# Patient Record
Sex: MALE | Race: BLACK OR AFRICAN AMERICAN | NOT HISPANIC OR LATINO | Employment: OTHER | URBAN - METROPOLITAN AREA
[De-identification: names, ages, dates, MRNs, and addresses within clinical notes are randomized per-mention and may not be internally consistent; named-entity substitution may affect disease eponyms.]

---

## 2021-05-26 ENCOUNTER — RECORDS - HEALTHEAST (OUTPATIENT)
Dept: ADMINISTRATIVE | Facility: CLINIC | Age: 66
End: 2021-05-26

## 2021-06-02 ENCOUNTER — RECORDS - HEALTHEAST (OUTPATIENT)
Dept: ADMINISTRATIVE | Facility: CLINIC | Age: 66
End: 2021-06-02

## 2022-05-09 ENCOUNTER — HOSPITAL ENCOUNTER (EMERGENCY)
Facility: HOSPITAL | Age: 67
Discharge: HOME | End: 2022-05-09
Attending: EMERGENCY MEDICINE | Admitting: EMERGENCY MEDICINE
Payer: COMMERCIAL

## 2022-05-09 VITALS
OXYGEN SATURATION: 98 % | HEART RATE: 60 BPM | RESPIRATION RATE: 20 BRPM | SYSTOLIC BLOOD PRESSURE: 141 MMHG | TEMPERATURE: 98.7 F | WEIGHT: 173 LBS | DIASTOLIC BLOOD PRESSURE: 70 MMHG

## 2022-05-09 DIAGNOSIS — R33.9 URINARY RETENTION: ICD-10-CM

## 2022-05-09 LAB
ALBUMIN UR-MCNC: NEGATIVE MG/DL
APPEARANCE UR: CLEAR
BILIRUB UR QL STRIP: NEGATIVE
COLOR UR AUTO: ABNORMAL
GLUCOSE UR STRIP-MCNC: NEGATIVE MG/DL
HGB UR QL STRIP: NEGATIVE
KETONES UR STRIP-MCNC: NEGATIVE MG/DL
LEUKOCYTE ESTERASE UR QL STRIP: NEGATIVE
NITRATE UR QL: NEGATIVE
PH UR STRIP: 7 [PH] (ref 5–7)
RBC URINE: 4 /HPF
SP GR UR STRIP: 1.02 (ref 1–1.03)
TRANSITIONAL EPI: <1 /HPF
UROBILINOGEN UR STRIP-MCNC: <2 MG/DL
WBC URINE: <1 /HPF

## 2022-05-09 PROCEDURE — 51702 INSERT TEMP BLADDER CATH: CPT

## 2022-05-09 PROCEDURE — 99284 EMERGENCY DEPT VISIT MOD MDM: CPT | Mod: 25

## 2022-05-09 PROCEDURE — 250N000013 HC RX MED GY IP 250 OP 250 PS 637: Performed by: EMERGENCY MEDICINE

## 2022-05-09 PROCEDURE — 81001 URINALYSIS AUTO W/SCOPE: CPT | Performed by: EMERGENCY MEDICINE

## 2022-05-09 PROCEDURE — 51798 US URINE CAPACITY MEASURE: CPT

## 2022-05-09 RX ORDER — TAMSULOSIN HYDROCHLORIDE 0.4 MG/1
0.4 CAPSULE ORAL DAILY
Qty: 10 CAPSULE | Refills: 0 | Status: SHIPPED | OUTPATIENT
Start: 2022-05-09 | End: 2022-05-19

## 2022-05-09 RX ORDER — OXYCODONE HYDROCHLORIDE 5 MG/1
5 TABLET ORAL ONCE
Status: COMPLETED | OUTPATIENT
Start: 2022-05-09 | End: 2022-05-09

## 2022-05-09 RX ORDER — ACETAMINOPHEN 325 MG/1
650 TABLET ORAL ONCE
Status: DISCONTINUED | OUTPATIENT
Start: 2022-05-09 | End: 2022-05-10 | Stop reason: HOSPADM

## 2022-05-09 RX ADMIN — OXYCODONE HYDROCHLORIDE 5 MG: 5 TABLET ORAL at 22:41

## 2022-05-09 NOTE — ED TRIAGE NOTES
Patient arrives by private car for evaluation of inability to urinate.  Patient had lumbar decompression this am, has been unable to urinate since then.

## 2022-05-09 NOTE — ED NOTES
ED Provider In Triage Note  [facility]  Encounter Date: May 9, 2022    Chief Complaint   Patient presents with     Urinary Retention         Use of Intrepreter: N/A      Brief HPI:   Mikhail Espino is a 67 year old male presenting to the Emergency Department with a chief complaint of urinary retention.     Outpatient lumbar decompression this morning and hasn't been able to urinate since.    Denies any current back pain but has 10/10 bladder discomfort.    Denies new numbness or weakness in legs.        Brief Physical Exam:  BP (!) 176/92   Pulse 84   Temp 98.4  F (36.9  C) (Oral)   Resp 22   Wt 78.5 kg (173 lb)   SpO2 100%   General: Non-toxic appearing  HEENT: Atraumatic  Resp: No respiratory distress  Abdomen: +lower abd pain  Neuro: Alert, oriented, answers questions appropriately  Psych: Behavior appropriate  Musculoskeletal: walked into triage without limitation.      Plan Initiated in Triage:  Bladder scan      PIT Dispo:   Return to lobby while awaiting workup and ED bed availability          Humera Sloan MD on 5/9/2022 at 6:51 PM        Patient was evaluated by the Physician in Triage due to a limitation of available rooms in the Emergency Department. A plan of care was discussed based on the information obtained on the initial evaluation and patient was counseled to return back to the Emergency Department lobby after this initial evalutaiton until results were obtained or a room became available in the Emergency Department. Patient was counseled not to leave prior to receiving the results of their workup.            Humera Sloan MD  05/09/22 [id number]

## 2022-05-10 NOTE — DISCHARGE INSTRUCTIONS
Continue Flomax.  Follow-up with your urologist this week for reassessment and Thacker catheter removal.  Return to the emergency department if nondraining catheter problems with follow-up.

## 2022-05-10 NOTE — ED NOTES
Pt and wife very upset about the wait to see a doctor. Pt given oxycodone. Wife has already given pt tylenol in the lobby herself. Wanting to leave. Has catheter in. Wants to lay down. Just had back surgery today.

## 2022-05-10 NOTE — ED PROVIDER NOTES
EMERGENCY DEPARTMENT NOTE     Name: Mikhail Espino    Age/Sex: 67 year old male   MRN: [id number]   Evaluation Date & Time:  No admission date for patient encounter.    PCP:    No primary care provider on file.   ED Provider: Linwood Dumont D.O.       CHIEF COMPLAINT    Urinary Retention       DIAGNOSIS & DISPOSITION     1. Urinary retention      DISPOSITION: Home    At the conclusion of the encounter I discussed the results of all of the tests and the disposition. The questions were answered. The patient or family acknowledged understanding and was agreeable with the care plan.    TOTAL CRITICAL CARE TIME (EXCLUDING PROCEDURES): Not applicable    PROCEDURES:   None    EMERGENCY DEPARTMENT COURSE/MEDICAL DECISION MAKING   11:10 PM I met with the patient to gather history and to perform my initial exam.  We discussed treatment options and the plan for care while in the Emergency Department. PPE worn: N95 mask, surgical mask  11:20 PM We discussed the plan for discharge and the patient is agreeable. Reviewed supportive cares, symptomatic treatment, outpatient follow up, and reasons to return to the Emergency Department. Patient to be discharged by ED RN.     Mikhail Espino is a 67 year old male with relevant past history of back pain who presents to the emergency department for evaluation of urinary retention.     Triage note reviewed:Patient arrives by private car for evaluation of inability to urinate.  Patient had lumbar decompression this am, has been unable to urinate since then    Vital signs:BP (!) 141/70 (BP Location: Right arm, Patient Position: Sitting, Cuff Size: Adult Regular)   Pulse 60   Temp 98.7  F (37.1  C) (Oral)   Resp 20   Wt 78.5 kg (173 lb)   SpO2 98%   Pertinent physical exam findings:  General: Patient resting comfortably on the stretcher feels improved after Thacker catheter placement  Abdomen: Soft nontender, positive bowel sounds.  No organomegaly or mass  Musculoskeletal: Dressing in  place on lumbar wound with no active bleeding  Neuro: 5 out of 5 motor strength of the lower extremities with intact deep tendon reflexes at the patella normal sensation to light touch and positional sense  Diagnostic studies:  Imaging:  No orders to display      Lab:  Labs Ordered and Resulted from Time of ED Arrival to Time of ED Departure   ROUTINE UA WITH MICROSCOPIC REFLEX TO CULTURE - Abnormal       Result Value    Color Urine Light Yellow      Appearance Urine Clear      Glucose Urine Negative      Bilirubin Urine Negative      Ketones Urine Negative      Specific Gravity Urine 1.020      Blood Urine Negative      pH Urine 7.0      Protein Albumin Urine Negative      Urobilinogen Urine <2.0      Nitrite Urine Negative      Leukocyte Esterase Urine Negative      RBC Urine 4 (*)     WBC Urine <1      Transitional Epithelials Urine <1        Interventions: Thacker catheter placement  Medical decision making: Patient urinary retention following lumbar discectomy today.  No acute neurologic symptoms.  Patient has underlying BPH and I have been secondary to combination of anesthesia and underlying BPH.  Patient will be discharged with Thacker catheter in place.  He has been followed by urologist at [facility] and will make appointment for Thacker catheter removal and reassessment.  Continue Flomax until Thacker catheter is removed.  Return criteria discussed and if nondraining Thacker catheter or problems with follow-up will return to the emergency department.    ED INTERVENTIONS     Medications   acetaminophen (TYLENOL) tablet 650 mg (650 mg Oral Not Given 5/9/22 2321)   oxyCODONE (ROXICODONE) tablet 5 mg (5 mg Oral Given 5/9/22 2241)       DISCHARGE MEDICATIONS        Review of your medicines      START taking      Dose / Directions   tamsulosin 0.4 MG capsule  Commonly known as: Flomax      Dose: 0.4 mg  Take 1 capsule (0.4 mg) by mouth daily for 10 doses  Quantity: 10 capsule  Refills: 0           Where to get your  medicines      Some of these will need a paper prescription and others can be bought over the counter. Ask your nurse if you have questions.    Bring a paper prescription for each of these medications    tamsulosin 0.4 MG capsule           INFORMATION SOURCE AND LIMITATIONS    History/Exam limitations: N/A  Patient information was obtained from: Patient   Use of : N/A    HISTORY OF PRESENT ILLNESS   Mikhail Espino is a 67 year old year old male with a relevant past history of back pain, who presents to this ED by walk in for evaluation of urinary retention.    After having back surgery this morning, the patient reports being unable to urinate. The patient also reports being recently diagnosed with prostate cancer. He does note that he has had some difficulty with urinating due to his prostate cancer, but adds that it has never been to this extent. The patient was prescribed Flomax from his back surgeon.   No other symptoms or complaints at this time.       REVIEW OF SYSTEMS:   Constitutional: Negative for  fever.   HENT: Negative for URI symptoms or sore throat.    Cardiac: Negative for  chest pain,palpitations, near syncope or syncope  Respiratory: Negative for cough and shortness of breath.    Gastrointestinal: Negative for abdominal pain, nausea, vomiting, constipation, diarrhea, rectal bleeding or melena.  Genitourinary: Positive for inability to urinate. Negative for dysuria, flank pain and hematuria.   Musculoskeletal: Negative for back pain.   Skin: Negative for  rash  Neurological: Negative for dizziness, headache, syncope, speech difficulty, unilateral weakness or imbalance with walking.   Hematological: Negative for adenopathy. Does not bruise/bleed easily.   Psychiatric/Behavioral: Negative for confusion.       PATIENT HISTORY   History reviewed. No pertinent past medical history.  There is no problem list on file for this patient.    History reviewed. No pertinent surgical history.  Social  Histrory  Smoking:  Alcohol Use:  No Known Allergies      OUTPATIENT MEDICATIONS     New Prescriptions    TAMSULOSIN (FLOMAX) 0.4 MG CAPSULE    Take 1 capsule (0.4 mg) by mouth daily for 10 doses      Vitals:    05/09/22 1854 05/09/22 2137   BP: (!) 176/92 (!) 141/70   BP Location:  Right arm   Patient Position:  Sitting   Cuff Size:  Adult Regular   Pulse: 84 60   Resp: 22 20   Temp: 98.4  F (36.9  C) 98.7  F (37.1  C)   TempSrc: Oral Oral   SpO2: 100% 98%   Weight: 78.5 kg (173 lb)        Physical Exam   Constitutional: Oriented to person, place, and time. Appears well-developed and well-nourished.  HEENT:    Head: Atraumatic.   Neck: Normal range of motion. Neck supple.   Cardiovascular: Normal rate, regular rhythm and normal heart sounds.    Pulmonary/Chest: Normal effort  and breath sounds normal.   Abdominal: Soft. Bowel sounds are normal.   GI: Full length catheter in place.  Musculoskeletal: Normal range of motion. Dressing in place on lower back with small amount of blood but no active bleeding.  Neurological: Alert and oriented to person, place, and time. Normal strength.No sensory deficit. No cranial nerve deficit. 5x5 motor senses. Intact sensations.   Skin: Skin is warm and dry.   Psychiatric: Normal mood and affect. Behavior is normal. Thought content normal.       DIAGNOSTICS    LABORATORY FINDINGS (REVIEWED AND INTERPRETED):  Labs Ordered and Resulted from Time of ED Arrival to Time of ED Departure   ROUTINE UA WITH MICROSCOPIC REFLEX TO CULTURE - Abnormal       Result Value    Color Urine Light Yellow      Appearance Urine Clear      Glucose Urine Negative      Bilirubin Urine Negative      Ketones Urine Negative      Specific Gravity Urine 1.020      Blood Urine Negative      pH Urine 7.0      Protein Albumin Urine Negative      Urobilinogen Urine <2.0      Nitrite Urine Negative      Leukocyte Esterase Urine Negative      RBC Urine 4 (*)     WBC Urine <1      Transitional Epithelials Urine <1                I, Kendall Sebastian, am serving as a scribe to document services personally performed by Linwood Dumont D.O., based on my observation and the provider s statements to me.    I, Linwood Dumont D.O., attest that Kendall Sebastian is acting in a scribe capacity, has observed my performance of the services and has documented them in accordance with my direction.    Linwood Dumont D.O.  EMERGENCY MEDICINE   05/09/22  [facility] EMERGENCY DEPARTMENT  [address] [place] [id number]  [phone]  Dept: [phone]     Linwood Dumont DO  05/10/22 0033

## 2022-06-06 ENCOUNTER — TRANSFERRED RECORDS (OUTPATIENT)
Dept: HEALTH INFORMATION MANAGEMENT | Facility: CLINIC | Age: 67
End: 2022-06-06

## 2022-08-04 RX ORDER — ZINC GLUCONATE 50 MG
50 TABLET ORAL DAILY
Status: ON HOLD | COMMUNITY
End: 2022-08-08

## 2022-08-04 RX ORDER — MULTIPLE VITAMINS W/ MINERALS TAB 9MG-400MCG
1 TAB ORAL DAILY
Status: ON HOLD | COMMUNITY
End: 2022-08-09

## 2022-08-04 RX ORDER — ACETAMINOPHEN 500 MG
500 TABLET ORAL EVERY 6 HOURS PRN
COMMUNITY
End: 2022-08-05

## 2022-08-04 RX ORDER — UBIDECARENONE 100 MG
100 CAPSULE ORAL DAILY
Status: ON HOLD | COMMUNITY
End: 2022-08-09

## 2022-08-07 ENCOUNTER — ANESTHESIA EVENT (OUTPATIENT)
Dept: SURGERY | Facility: HOSPITAL | Age: 67
End: 2022-08-07
Payer: COMMERCIAL

## 2022-08-08 ENCOUNTER — ANESTHESIA (OUTPATIENT)
Dept: SURGERY | Facility: HOSPITAL | Age: 67
End: 2022-08-08
Payer: COMMERCIAL

## 2022-08-08 ENCOUNTER — HOSPITAL ENCOUNTER (OUTPATIENT)
Facility: HOSPITAL | Age: 67
Setting detail: OBSERVATION
Discharge: HOME | End: 2022-08-10
Attending: UROLOGY | Admitting: UROLOGY
Payer: COMMERCIAL

## 2022-08-08 DIAGNOSIS — C61 PROSTATE CANCER (H): Primary | ICD-10-CM

## 2022-08-08 LAB
ERYTHROCYTE [DISTWIDTH] IN BLOOD BY AUTOMATED COUNT: 13.6 % (ref 10–15)
HCT VFR BLD AUTO: 42.7 % (ref 40–53)
HGB BLD-MCNC: 14.5 G/DL (ref 13.3–17.7)
HOLD SPECIMEN: NORMAL
MCH RBC QN AUTO: 30.1 PG (ref 26.5–33)
MCHC RBC AUTO-ENTMCNC: 34 G/DL (ref 31.5–36.5)
MCV RBC AUTO: 89 FL (ref 78–100)
PLATELET # BLD AUTO: 119 10E3/UL (ref 150–450)
RBC # BLD AUTO: 4.82 10E6/UL (ref 4.4–5.9)
WBC # BLD AUTO: 2.9 10E3/UL (ref 4–11)

## 2022-08-08 PROCEDURE — 96361 HYDRATE IV INFUSION ADD-ON: CPT

## 2022-08-08 PROCEDURE — 250N000011 HC RX IP 250 OP 636: Performed by: NURSE ANESTHETIST, CERTIFIED REGISTERED

## 2022-08-08 PROCEDURE — G0378 HOSPITAL OBSERVATION PER HR: HCPCS

## 2022-08-08 PROCEDURE — 710N000009 HC RECOVERY PHASE 1, LEVEL 1, PER MIN: Performed by: UROLOGY

## 2022-08-08 PROCEDURE — 85027 COMPLETE CBC AUTOMATED: CPT | Performed by: ANESTHESIOLOGY

## 2022-08-08 PROCEDURE — 258N000003 HC RX IP 258 OP 636: Performed by: ANESTHESIOLOGY

## 2022-08-08 PROCEDURE — 120N000001 HC R&B MED SURG/OB

## 2022-08-08 PROCEDURE — 250N000009 HC RX 250: Performed by: UROLOGY

## 2022-08-08 PROCEDURE — 250N000009 HC RX 250: Performed by: NURSE ANESTHETIST, CERTIFIED REGISTERED

## 2022-08-08 PROCEDURE — 360N000080 HC SURGERY LEVEL 7, PER MIN: Performed by: UROLOGY

## 2022-08-08 PROCEDURE — 96360 HYDRATION IV INFUSION INIT: CPT | Mod: XU

## 2022-08-08 PROCEDURE — 250N000013 HC RX MED GY IP 250 OP 250 PS 637: Performed by: ANESTHESIOLOGY

## 2022-08-08 PROCEDURE — 250N000011 HC RX IP 250 OP 636: Performed by: NURSE PRACTITIONER

## 2022-08-08 PROCEDURE — 272N000001 HC OR GENERAL SUPPLY STERILE: Performed by: UROLOGY

## 2022-08-08 PROCEDURE — 36415 COLL VENOUS BLD VENIPUNCTURE: CPT | Performed by: ANESTHESIOLOGY

## 2022-08-08 PROCEDURE — 88307 TISSUE EXAM BY PATHOLOGIST: CPT | Mod: TC | Performed by: UROLOGY

## 2022-08-08 PROCEDURE — 258N000003 HC RX IP 258 OP 636: Performed by: UROLOGY

## 2022-08-08 PROCEDURE — 250N000025 HC SEVOFLURANE, PER MIN: Performed by: UROLOGY

## 2022-08-08 PROCEDURE — 999N000141 HC STATISTIC PRE-PROCEDURE NURSING ASSESSMENT: Performed by: UROLOGY

## 2022-08-08 PROCEDURE — 250N000013 HC RX MED GY IP 250 OP 250 PS 637: Performed by: UROLOGY

## 2022-08-08 PROCEDURE — 370N000017 HC ANESTHESIA TECHNICAL FEE, PER MIN: Performed by: UROLOGY

## 2022-08-08 PROCEDURE — 88309 TISSUE EXAM BY PATHOLOGIST: CPT | Mod: TC | Performed by: UROLOGY

## 2022-08-08 RX ORDER — KETAMINE HYDROCHLORIDE 10 MG/ML
INJECTION INTRAMUSCULAR; INTRAVENOUS PRN
Status: DISCONTINUED | OUTPATIENT
Start: 2022-08-08 | End: 2022-08-08

## 2022-08-08 RX ORDER — ACETAMINOPHEN 325 MG/1
975 TABLET ORAL EVERY 8 HOURS
Status: DISCONTINUED | OUTPATIENT
Start: 2022-08-08 | End: 2022-08-10 | Stop reason: HOSPADM

## 2022-08-08 RX ORDER — OXYCODONE HYDROCHLORIDE 5 MG/1
10 TABLET ORAL EVERY 4 HOURS PRN
Status: DISCONTINUED | OUTPATIENT
Start: 2022-08-08 | End: 2022-08-10 | Stop reason: HOSPADM

## 2022-08-08 RX ORDER — GLYCOPYRROLATE 0.2 MG/ML
INJECTION, SOLUTION INTRAMUSCULAR; INTRAVENOUS PRN
Status: DISCONTINUED | OUTPATIENT
Start: 2022-08-08 | End: 2022-08-08

## 2022-08-08 RX ORDER — PROPOFOL 10 MG/ML
INJECTION, EMULSION INTRAVENOUS PRN
Status: DISCONTINUED | OUTPATIENT
Start: 2022-08-08 | End: 2022-08-08

## 2022-08-08 RX ORDER — OXYCODONE HYDROCHLORIDE 5 MG/1
5 TABLET ORAL EVERY 4 HOURS PRN
Status: DISCONTINUED | OUTPATIENT
Start: 2022-08-08 | End: 2022-08-10 | Stop reason: HOSPADM

## 2022-08-08 RX ORDER — SODIUM CHLORIDE 9 MG/ML
INJECTION, SOLUTION INTRAVENOUS CONTINUOUS
Status: DISCONTINUED | OUTPATIENT
Start: 2022-08-08 | End: 2022-08-09

## 2022-08-08 RX ORDER — LIDOCAINE HYDROCHLORIDE 10 MG/ML
INJECTION, SOLUTION INFILTRATION; PERINEURAL PRN
Status: DISCONTINUED | OUTPATIENT
Start: 2022-08-08 | End: 2022-08-08

## 2022-08-08 RX ORDER — ACETAMINOPHEN 325 MG/1
975 TABLET ORAL ONCE
Status: COMPLETED | OUTPATIENT
Start: 2022-08-08 | End: 2022-08-08

## 2022-08-08 RX ORDER — ATROPA BELLADONNA AND OPIUM 16.2; 3 MG/1; MG/1
30 SUPPOSITORY RECTAL 3 TIMES DAILY PRN
Status: DISCONTINUED | OUTPATIENT
Start: 2022-08-08 | End: 2022-08-10 | Stop reason: HOSPADM

## 2022-08-08 RX ORDER — ACETAMINOPHEN 325 MG/1
650 TABLET ORAL EVERY 4 HOURS PRN
Status: DISCONTINUED | OUTPATIENT
Start: 2022-08-11 | End: 2022-08-10 | Stop reason: HOSPADM

## 2022-08-08 RX ORDER — HEPARIN SODIUM 5000 [USP'U]/.5ML
5000 INJECTION, SOLUTION INTRAVENOUS; SUBCUTANEOUS EVERY 8 HOURS
Status: DISCONTINUED | OUTPATIENT
Start: 2022-08-09 | End: 2022-08-10 | Stop reason: HOSPADM

## 2022-08-08 RX ORDER — PROCHLORPERAZINE MALEATE 5 MG
5 TABLET ORAL EVERY 6 HOURS PRN
Status: DISCONTINUED | OUTPATIENT
Start: 2022-08-08 | End: 2022-08-10 | Stop reason: HOSPADM

## 2022-08-08 RX ORDER — AMOXICILLIN 250 MG
1 CAPSULE ORAL 2 TIMES DAILY
Status: DISCONTINUED | OUTPATIENT
Start: 2022-08-08 | End: 2022-08-10 | Stop reason: HOSPADM

## 2022-08-08 RX ORDER — ONDANSETRON 2 MG/ML
4 INJECTION INTRAMUSCULAR; INTRAVENOUS EVERY 6 HOURS PRN
Status: DISCONTINUED | OUTPATIENT
Start: 2022-08-08 | End: 2022-08-10 | Stop reason: HOSPADM

## 2022-08-08 RX ORDER — POLYETHYLENE GLYCOL 3350 17 G/17G
17 POWDER, FOR SOLUTION ORAL DAILY
Status: DISCONTINUED | OUTPATIENT
Start: 2022-08-09 | End: 2022-08-10 | Stop reason: HOSPADM

## 2022-08-08 RX ORDER — FENTANYL CITRATE 50 UG/ML
50 INJECTION, SOLUTION INTRAMUSCULAR; INTRAVENOUS
Status: DISCONTINUED | OUTPATIENT
Start: 2022-08-08 | End: 2022-08-08 | Stop reason: HOSPADM

## 2022-08-08 RX ORDER — ONDANSETRON 4 MG/1
4 TABLET, ORALLY DISINTEGRATING ORAL EVERY 6 HOURS PRN
Status: DISCONTINUED | OUTPATIENT
Start: 2022-08-08 | End: 2022-08-10 | Stop reason: HOSPADM

## 2022-08-08 RX ORDER — ALBUTEROL SULFATE 0.83 MG/ML
2.5 SOLUTION RESPIRATORY (INHALATION) EVERY 4 HOURS PRN
Status: DISCONTINUED | OUTPATIENT
Start: 2022-08-08 | End: 2022-08-08 | Stop reason: HOSPADM

## 2022-08-08 RX ORDER — NALOXONE HYDROCHLORIDE 1 MG/ML
0.4 INJECTION INTRAMUSCULAR; INTRAVENOUS; SUBCUTANEOUS
Status: DISCONTINUED | OUTPATIENT
Start: 2022-08-08 | End: 2022-08-10 | Stop reason: HOSPADM

## 2022-08-08 RX ORDER — ONDANSETRON 2 MG/ML
4 INJECTION INTRAMUSCULAR; INTRAVENOUS EVERY 30 MIN PRN
Status: DISCONTINUED | OUTPATIENT
Start: 2022-08-08 | End: 2022-08-08 | Stop reason: HOSPADM

## 2022-08-08 RX ORDER — BISACODYL 10 MG
10 SUPPOSITORY, RECTAL RECTAL DAILY PRN
Status: DISCONTINUED | OUTPATIENT
Start: 2022-08-08 | End: 2022-08-10 | Stop reason: HOSPADM

## 2022-08-08 RX ORDER — CEFAZOLIN SODIUM/WATER 2 G/20 ML
2 SYRINGE (ML) INTRAVENOUS SEE ADMIN INSTRUCTIONS
Status: DISCONTINUED | OUTPATIENT
Start: 2022-08-08 | End: 2022-08-08 | Stop reason: HOSPADM

## 2022-08-08 RX ORDER — OXYCODONE HYDROCHLORIDE 5 MG/1
5 TABLET ORAL EVERY 4 HOURS PRN
Status: DISCONTINUED | OUTPATIENT
Start: 2022-08-08 | End: 2022-08-08 | Stop reason: HOSPADM

## 2022-08-08 RX ORDER — LIDOCAINE 40 MG/G
CREAM TOPICAL
Status: DISCONTINUED | OUTPATIENT
Start: 2022-08-08 | End: 2022-08-10 | Stop reason: HOSPADM

## 2022-08-08 RX ORDER — FENTANYL CITRATE 50 UG/ML
INJECTION, SOLUTION INTRAMUSCULAR; INTRAVENOUS PRN
Status: DISCONTINUED | OUTPATIENT
Start: 2022-08-08 | End: 2022-08-08

## 2022-08-08 RX ORDER — ATROPA BELLADONNA AND OPIUM 16.2; 3 MG/1; MG/1
SUPPOSITORY RECTAL PRN
Status: DISCONTINUED | OUTPATIENT
Start: 2022-08-08 | End: 2022-08-08 | Stop reason: HOSPADM

## 2022-08-08 RX ORDER — SODIUM CHLORIDE, SODIUM LACTATE, POTASSIUM CHLORIDE, CALCIUM CHLORIDE 600; 310; 30; 20 MG/100ML; MG/100ML; MG/100ML; MG/100ML
INJECTION, SOLUTION INTRAVENOUS CONTINUOUS
Status: DISCONTINUED | OUTPATIENT
Start: 2022-08-08 | End: 2022-08-08 | Stop reason: HOSPADM

## 2022-08-08 RX ORDER — MEPERIDINE HYDROCHLORIDE 25 MG/ML
12.5 INJECTION INTRAMUSCULAR; INTRAVENOUS; SUBCUTANEOUS
Status: DISCONTINUED | OUTPATIENT
Start: 2022-08-08 | End: 2022-08-08 | Stop reason: HOSPADM

## 2022-08-08 RX ORDER — ONDANSETRON 2 MG/ML
INJECTION INTRAMUSCULAR; INTRAVENOUS PRN
Status: DISCONTINUED | OUTPATIENT
Start: 2022-08-08 | End: 2022-08-08

## 2022-08-08 RX ORDER — LABETALOL HYDROCHLORIDE 5 MG/ML
10 INJECTION, SOLUTION INTRAVENOUS
Status: DISCONTINUED | OUTPATIENT
Start: 2022-08-08 | End: 2022-08-08 | Stop reason: HOSPADM

## 2022-08-08 RX ORDER — CEFAZOLIN SODIUM/WATER 2 G/20 ML
2 SYRINGE (ML) INTRAVENOUS
Status: COMPLETED | OUTPATIENT
Start: 2022-08-08 | End: 2022-08-08

## 2022-08-08 RX ORDER — HALOPERIDOL 5 MG/ML
1 INJECTION INTRAMUSCULAR
Status: DISCONTINUED | OUTPATIENT
Start: 2022-08-08 | End: 2022-08-08 | Stop reason: HOSPADM

## 2022-08-08 RX ORDER — NALOXONE HYDROCHLORIDE 1 MG/ML
0.2 INJECTION INTRAMUSCULAR; INTRAVENOUS; SUBCUTANEOUS
Status: DISCONTINUED | OUTPATIENT
Start: 2022-08-08 | End: 2022-08-10 | Stop reason: HOSPADM

## 2022-08-08 RX ORDER — LIDOCAINE 40 MG/G
CREAM TOPICAL
Status: DISCONTINUED | OUTPATIENT
Start: 2022-08-08 | End: 2022-08-08 | Stop reason: HOSPADM

## 2022-08-08 RX ORDER — HYDROMORPHONE HCL IN WATER/PF 6 MG/30 ML
0.2 PATIENT CONTROLLED ANALGESIA SYRINGE INTRAVENOUS
Status: DISCONTINUED | OUTPATIENT
Start: 2022-08-08 | End: 2022-08-10 | Stop reason: HOSPADM

## 2022-08-08 RX ORDER — BUPIVACAINE HYDROCHLORIDE 5 MG/ML
INJECTION, SOLUTION PERINEURAL PRN
Status: DISCONTINUED | OUTPATIENT
Start: 2022-08-08 | End: 2022-08-08 | Stop reason: HOSPADM

## 2022-08-08 RX ORDER — HYDROMORPHONE HYDROCHLORIDE 1 MG/ML
0.4 INJECTION, SOLUTION INTRAMUSCULAR; INTRAVENOUS; SUBCUTANEOUS EVERY 5 MIN PRN
Status: DISCONTINUED | OUTPATIENT
Start: 2022-08-08 | End: 2022-08-08 | Stop reason: HOSPADM

## 2022-08-08 RX ORDER — FENTANYL CITRATE 50 UG/ML
50 INJECTION, SOLUTION INTRAMUSCULAR; INTRAVENOUS EVERY 5 MIN PRN
Status: DISCONTINUED | OUTPATIENT
Start: 2022-08-08 | End: 2022-08-08 | Stop reason: HOSPADM

## 2022-08-08 RX ORDER — EPHEDRINE SULFATE 50 MG/ML
INJECTION, SOLUTION INTRAMUSCULAR; INTRAVENOUS; SUBCUTANEOUS PRN
Status: DISCONTINUED | OUTPATIENT
Start: 2022-08-08 | End: 2022-08-08

## 2022-08-08 RX ORDER — ONDANSETRON 4 MG/1
4 TABLET, ORALLY DISINTEGRATING ORAL EVERY 30 MIN PRN
Status: DISCONTINUED | OUTPATIENT
Start: 2022-08-08 | End: 2022-08-08 | Stop reason: HOSPADM

## 2022-08-08 RX ORDER — DEXAMETHASONE SODIUM PHOSPHATE 10 MG/ML
INJECTION, SOLUTION INTRAMUSCULAR; INTRAVENOUS PRN
Status: DISCONTINUED | OUTPATIENT
Start: 2022-08-08 | End: 2022-08-08

## 2022-08-08 RX ORDER — HYDROMORPHONE HCL IN WATER/PF 6 MG/30 ML
0.4 PATIENT CONTROLLED ANALGESIA SYRINGE INTRAVENOUS
Status: DISCONTINUED | OUTPATIENT
Start: 2022-08-08 | End: 2022-08-10 | Stop reason: HOSPADM

## 2022-08-08 RX ADMIN — ONDANSETRON 4 MG: 2 INJECTION INTRAMUSCULAR; INTRAVENOUS at 13:03

## 2022-08-08 RX ADMIN — SODIUM CHLORIDE: 9 INJECTION, SOLUTION INTRAVENOUS at 20:17

## 2022-08-08 RX ADMIN — MIDAZOLAM 2 MG: 1 INJECTION INTRAMUSCULAR; INTRAVENOUS at 10:24

## 2022-08-08 RX ADMIN — PROPOFOL 160 MG: 10 INJECTION, EMULSION INTRAVENOUS at 10:42

## 2022-08-08 RX ADMIN — SODIUM CHLORIDE, POTASSIUM CHLORIDE, SODIUM LACTATE AND CALCIUM CHLORIDE: 600; 310; 30; 20 INJECTION, SOLUTION INTRAVENOUS at 12:23

## 2022-08-08 RX ADMIN — ROCURONIUM BROMIDE 20 MG: 50 INJECTION, SOLUTION INTRAVENOUS at 12:25

## 2022-08-08 RX ADMIN — ACETAMINOPHEN 975 MG: 325 TABLET ORAL at 09:37

## 2022-08-08 RX ADMIN — HYDROMORPHONE HYDROCHLORIDE 0.5 MG: 1 INJECTION, SOLUTION INTRAMUSCULAR; INTRAVENOUS; SUBCUTANEOUS at 11:25

## 2022-08-08 RX ADMIN — SODIUM CHLORIDE, POTASSIUM CHLORIDE, SODIUM LACTATE AND CALCIUM CHLORIDE: 600; 310; 30; 20 INJECTION, SOLUTION INTRAVENOUS at 09:53

## 2022-08-08 RX ADMIN — Medication 10 MG: at 11:25

## 2022-08-08 RX ADMIN — Medication 5 MG: at 12:16

## 2022-08-08 RX ADMIN — ROCURONIUM BROMIDE 20 MG: 50 INJECTION, SOLUTION INTRAVENOUS at 11:37

## 2022-08-08 RX ADMIN — ACETAMINOPHEN 975 MG: 325 TABLET ORAL at 22:07

## 2022-08-08 RX ADMIN — KETAMINE HYDROCHLORIDE 30 MG: 10 INJECTION, SOLUTION INTRAMUSCULAR; INTRAVENOUS at 10:41

## 2022-08-08 RX ADMIN — Medication 10 MG: at 11:48

## 2022-08-08 RX ADMIN — HYDROMORPHONE HYDROCHLORIDE 0.5 MG: 1 INJECTION, SOLUTION INTRAMUSCULAR; INTRAVENOUS; SUBCUTANEOUS at 12:25

## 2022-08-08 RX ADMIN — ROCURONIUM BROMIDE 50 MG: 50 INJECTION, SOLUTION INTRAVENOUS at 10:43

## 2022-08-08 RX ADMIN — Medication 2 G: at 10:45

## 2022-08-08 RX ADMIN — PROPOFOL 40 MG: 10 INJECTION, EMULSION INTRAVENOUS at 10:43

## 2022-08-08 RX ADMIN — KETAMINE HYDROCHLORIDE 20 MG: 10 INJECTION, SOLUTION INTRAMUSCULAR; INTRAVENOUS at 11:48

## 2022-08-08 RX ADMIN — GLYCOPYRROLATE 0.2 MG: 0.2 INJECTION, SOLUTION INTRAMUSCULAR; INTRAVENOUS at 11:04

## 2022-08-08 RX ADMIN — DEXAMETHASONE SODIUM PHOSPHATE 10 MG: 10 INJECTION, SOLUTION INTRAMUSCULAR; INTRAVENOUS at 10:50

## 2022-08-08 RX ADMIN — ROCURONIUM BROMIDE 30 MG: 50 INJECTION, SOLUTION INTRAVENOUS at 11:05

## 2022-08-08 RX ADMIN — SUGAMMADEX 200 MG: 100 INJECTION, SOLUTION INTRAVENOUS at 13:24

## 2022-08-08 RX ADMIN — LIDOCAINE HYDROCHLORIDE 50 MG: 10 INJECTION, SOLUTION INFILTRATION; PERINEURAL at 10:41

## 2022-08-08 RX ADMIN — FENTANYL CITRATE 100 MCG: 50 INJECTION, SOLUTION INTRAMUSCULAR; INTRAVENOUS at 10:41

## 2022-08-08 RX ADMIN — SENNOSIDES AND DOCUSATE SODIUM 1 TABLET: 50; 8.6 TABLET ORAL at 22:06

## 2022-08-08 ASSESSMENT — ACTIVITIES OF DAILY LIVING (ADL)
ADLS_ACUITY_SCORE: 22

## 2022-08-08 NOTE — ANESTHESIA PREPROCEDURE EVALUATION
Anesthesia Pre-Procedure Evaluation    Patient: Mikhail Espino   MRN: [id number] : 1955        Procedure : Procedure(s):  DAVINCI ROBOTIC RADICAL PROSTATECTOMY AND BILATERAL PELVIC LYMPH NODE DISSECTION          Past Medical History:   Diagnosis Date     Prostate cancer (H)      Thrombocytopenia (H)       History reviewed. No pertinent surgical history.   No Known Allergies   Social History     Tobacco Use     Smoking status: Never Smoker     Smokeless tobacco: Never Used   Substance Use Topics     Alcohol use: Not Currently      Wt Readings from Last 1 Encounters:   22 78.5 kg (173 lb)        Anesthesia Evaluation            ROS/MED HX  ENT/Pulmonary:  - neg pulmonary ROS     Neurologic:  - neg neurologic ROS     Cardiovascular:  - neg cardiovascular ROS     METS/Exercise Tolerance:     Hematologic:  - neg hematologic  ROS     Musculoskeletal:       GI/Hepatic:  - neg GI/hepatic ROS     Renal/Genitourinary:  - neg Renal ROS     Endo:  - neg endo ROS     Psychiatric/Substance Use:  - neg psychiatric ROS     Infectious Disease:  - neg infectious disease ROS     Malignancy:   (+) Malignancy, History of Prostate.    Other:            Physical Exam    Airway        Mallampati: I   TM distance: > 3 FB   Neck ROM: full   Mouth opening: > 3 cm    Respiratory Devices and Support         Dental  no notable dental history         Cardiovascular          Rhythm and rate: regular and normal     Pulmonary           breath sounds clear to auscultation       Other findings: Labs 22 from H&P  Na 140, K 5.2, Cr 0.9    OUTSIDE LABS:  CBC: No results found for: WBC, HGB, HCT, PLT  BMP: No results found for: NA, POTASSIUM, CHLORIDE, CO2, BUN, CR, GLC  COAGS: No results found for: PTT, INR, FIBR  POC: No results found for: BGM, HCG, HCGS  HEPATIC: No results found for: ALBUMIN, PROTTOTAL, ALT, AST, GGT, ALKPHOS, BILITOTAL, BILIDIRECT, DARRYN  OTHER: No results found for: PH, LACT, A1C, JOSEPH, PHOS, MAG, LIPASE, AMYLASE,  BABAK ambulatory encounter  FAMILY PRACTICE OFFICE VISIT    CHIEF COMPLAINT:    Chief Complaint   Patient presents with   • Results     discuss labs       SUBJECTIVE:  Fransico Dominguez is a 60 year old male who presented requesting evaluation for follow up weight issue.  He has been much better with food choices, quantity, etc.  He is eating less frequently.  He has trouble with sleeping.  Mostly 6-7 hours.   He is slightly more active with walks.  He walks 1-1.5 miles per trip.    Review of systems:   Constitutional: Negative for fever and chills.   Skin: Negative for rash.   HEENT: Negative for eye drainage, ear pain, sore throat.  Respiratory: Negative for cough or shortness of breath.    Cardiovascular: Negative for chest pain or chest pressure.   Gastrointestinal: Negative for nausea, vomiting, diarrhea or abdominal pain.   Genitourinary: Negative for dysuria, frequency or hematuria.  Extremities: Negative for joint swelling or joint pain.  Endocrine: Negative for heat or cold intolerance.  Psychiatric: Negative for change in mood or mentation.     OBJECTIVE:  PROBLEM LIST:   Patient Active Problem List   Diagnosis   • DJD (degenerative joint disease) of knee   • GIOVANNA (obstructive sleep apnea)   • Normocytic anemia   • Celiac disease   • Chronic folliculitis   • Onychomycosis of toenail   • Hyperlipidemia   • Essential hypertension   • Morbid obesity (CMS/[facility])   • Type 2 diabetes mellitus without complication, without long-term current use of insulin (CMS/[facility])   • Acromioclavicular joint arthritis   • Other rosacea   • Tinea corporis   • Primary osteoarthritis of both knees   • Chronic right-sided low back pain without sciatica       PAST HISTORIES:   I have reviewed the past medical history, family history, social history, medications and allergies listed in the medical record as obtained by my nursing staff and support staff and agree with their documentation.  Current Outpatient Medications   Medication Sig  Dispense Refill   • atorvastatin (LIPITOR) 40 MG tablet Take 1 tablet by mouth nightly. 90 tablet 3   • fenofibrate 160 MG tablet Take 1 tablet by mouth nightly. 90 tablet 3   • pioglitazone (ACTOS) 30 MG tablet Take 1 tablet by mouth daily. 90 tablet 3   • lisinopril (ZESTRIL) 20 MG tablet Take 1 tablet by mouth daily. 90 tablet 3   • ketoconazole (NIZORAL) 2 % cream Apply topically daily.     • sitaGLIPTIN-metFORMIN (JANUMET)  MG per tablet Take 1 tablet by mouth 2 times daily (with meals). 180 tablet 3   • azelaic acid (FINACEA) 15 % gel Apply topically 2 times daily. 50 g 11   • Triamcinolone Acetonide (TRIAMCINOLONE 0.1% IN EUCERIN) Apply locally twice daily as needed. 240 g 2   • Cholecalciferol (VITAMIN D) 2000 units capsule Take by mouth daily.     • vitamin - therapeutic multivitamins w/minerals (CENTRUM SILVER,THERA-M) Tab Take 1 tablet by mouth daily.     • B Complex Vitamins (B COMPLEX PO)      • LORazepam (ATIVAN) 0.5 MG tablet Take 1 tablet by mouth daily as needed for Anxiety. 10 tablet 0   • blood glucose (ONE TOUCH ULTRA TEST) test strip Test blood sugar 2-3 times daily as directed. Diagnosis: e11.92. Meter: one touch ultra 2 100 each 11   • ONE TOUCH LANCETS Misc Test blood sugar 2-3 times a day DX E11.9 100 each 11   • HYDROcodone-acetaminophen (NORCO) 5-325 MG per tablet Take 1 tablet by mouth every 8 hours as needed for Pain. 270 tablet 0     No current facility-administered medications for this visit.      Past Medical History:   Diagnosis Date   • Anemia    • Anxiety    • Celiac disease    • Colon polyp    • Difficulty initiating walking    • Diverticulosis of colon    • DJD (degenerative joint disease) of knee    • HLD (hyperlipidemia)    • HTN (hypertension)    • Hypogonadism male    • Morbid obesity (CMS/HCC)    • Obesity    • GIOVANNA (obstructive sleep apnea)    • Rash    • Type II or unspecified type diabetes mellitus without mention of complication, not stated as uncontrolled   TSH, T4, T3, CRP, SED    Anesthesia Plan    ASA Status:  3   NPO Status:  NPO Appropriate    Anesthesia Type: General.     - Airway: ETT         Techniques and Equipment:       - Drips/Meds: Ketamine     Consents    Anesthesia Plan(s) and associated risks, benefits, and realistic alternatives discussed. Questions answered and patient/representative(s) expressed understanding.    - Discussed:     - Discussed with:  Patient      - Extended Intubation/Ventilatory Support Discussed: No.      - Patient is DNR/DNI Status: No    Use of blood products discussed: No .     Postoperative Care       PONV prophylaxis: Ondansetron (or other 5HT-3), Dexamethasone or Solumedrol, Background Propofol Infusion     Comments:                Malik Paiz MD         PHYSICAL EXAM:   Vital Signs:    Visit Vitals  /72   Pulse 74   Temp 98.9 °F (37.2 °C) (Oral)   Resp 16   Ht 6' (1.829 m)   Wt (!) 171 kg   BMI 51.13 kg/m²     Pulse Ox Interpretation:  Pulse ox not performed  General:   Alert, cooperative, conversive in no acute distress.  Obese body habitus.  Skin:  Warm and dry without rash.    Head:  Normocephalic, atraumatic.   Neck:  Trachea is midline.   Supple, no nodes.  Eyes:  Normal conjunctivae and sclerae.  ENT:  Mucous membranes are moist.  Cardiovascular:  Symmetrical pulses. Regular.  Respiratory:  Normal respiratory effort.   Gastrointestinal:  Non-distended.  Soft, no masses.  Musculoskeletal:  No deformity or edema.   Neurologic:  Oriented x4.  No focal deficits.    LAB RESULTS:   Lab Results   Component Value Date    SODIUM 139 01/08/2020    POTASSIUM 4.1 01/08/2020    CHLORIDE 105 01/08/2020    CO2 26 01/08/2020    BUN 19 01/08/2020    CREATININE 1.04 01/08/2020    GLUCOSE 153 (H) 01/08/2020     Lab Results   Component Value Date    WBC 7.3 09/20/2019    HCT 38.2 (L) 09/20/2019    HGB 11.8 (L) 09/20/2019     09/20/2019     TSH (mcUnits/mL)   Date Value   01/08/2020 1.414     Lab Results   Component Value Date    CHOLESTEROL 144 09/20/2019    HDL 38 (L) 09/20/2019    CALCLDL 77 09/20/2019    TRIGLYCERIDE 145 09/20/2019       ASSESSMENT:   1. Type 2 diabetes mellitus without complication, without long-term current use of insulin (CMS/[facility])    2. Osteoarthritis of knee, unspecified laterality, unspecified osteoarthritis type    3. Normocytic anemia    4. Essential hypertension    5. Morbid obesity (CMS/[facility])    6. Primary osteoarthritis of both knees    7. Chronic right-sided low back pain without sciatica    8. S/P TKR (total knee replacement), bilateral        PLAN:   Orders Placed This Encounter   • HYDROcodone-acetaminophen (NORCO) 5-325 MG per tablet   • atorvastatin (LIPITOR) 40 MG tablet   • fenofibrate 160 MG tablet   • pioglitazone (ACTOS)  30 MG tablet   • lisinopril (ZESTRIL) 20 MG tablet     Refill meds.  No changes.  Continue healthy eating habits.  Will set up labs for 6 months.    No follow-ups on file.    Instructions provided as documented in the after visit summary.    The patient indicated understanding of the diagnosis and agreed with the plan of care.

## 2022-08-08 NOTE — OP NOTE
Name:  Mikhail Mendozakpu  Location: [facility] OR  Procedure Date:  8/8/2022  PCP:  Jd Aceves    Pre Op Diagnosis: Prostate Cancer    Post Op Diagnosis: Prostate Cancer    Procedure: DaVinci Robotic Assisted Radical Prostatectomy and Bilateral Pelvic Lymph Node Dissection    Surgeon: Mikey Villareal MD    Assistant:  Dr Johnnie Borrero    Request assistance for expertise in prostate cancer and robotic surgery.      Operative Report:      The patient underwent induction of general anesthesia was prepped and draped in the dorsal lithotomy position under sterile conditions.  Thacker cath was placed in the table.  A veress needle was placed through a stab incision below the umbilicus and the abdomen was insufflated to 15  mmHg.      Standard Davinci prots are placed. Inspection of the abdomen shows no adhesions     The robot is docked and  instruments were placed.      The sigmoid colon was reflected medially.      The posterior peritoneum behind the bladder was incised and the right left vas deferens were identified.   Using the 30-up Lens the plane was developed between the rectum and the prostate to the apex of the prostate.    The seminal vesicles were then then dissected free.  A pack was placed in the pelvis.    The urachal remnat then taken down working within space of retzius. The bladder was sharply and bluntly taken down to the endopelvic fascia.    A bilateral pelvic lymph node dissection was performed.  The obturator nerve was visualized and spared bilaterally.  Weck clips were used to control the lymphatics      The endopelvic fascia was then incised and the puboprostatic ligaments were ligated.  A dorsal venous complex stitch ×2 with a suspension suture to the pubic bone was placed.    Using the 30 down lens the lateral portion of the prostate was identified and the neurovascular bundles were dissected off from the apex to the  midportion of the prostate.    The bladder neck was then identified  and scored down to the thomas catheter.  The balloon was deflated.  The posterior bladder was identified and scored down to the seminal vesicles.  The vascular pedicles were progressively isolated and divided and sealed with  weck clips.  Care was used not to use any cautery near the nerves.    Using the 0 lens the apex was dissected of down to the urethra.  The urethra was transected.    The prostate was bagged along with the Ray-Sandy sponge.    The bladder neck was reconstructed using 2-0 vicryl.  The anastomosis was completed between the bladder and the urethra using a double armed 3-0 v-dlok and a posterior fixation 3-0 moncryl suture.  The thomas was placed, irrigated and noted to be watertight.  Sponge and needle count was correct.    The robot was undocked.  The specimen bag was brought to the midline port.   The trocars were removed.  The specimen was delivered.  The ports were infiltrated with Marcaine.  The fascia was closed in the midline port with interrupted #0 Vicryl. all skin incisions were closed with 4-0 Vicryl.      Patient sent to recovery room in stable condition.    Mikey Villareal MD

## 2022-08-08 NOTE — ANESTHESIA PROCEDURE NOTES
Airway       Patient location during procedure: OR       Procedure Start/Stop Times: 8/8/2022 10:45 AM  Staff -        Anesthesiologist:  Malik Paiz MD       CRNA: Chato Lee APRN CRNA       Performed By: CRNA  Consent for Airway        Urgency: elective  Indications and Patient Condition       Indications for airway management: isi-procedural       Induction type:intravenous       Mask difficulty assessment: 1 - vent by mask    Final Airway Details       Final airway type: endotracheal airway       Successful airway: ETT - single  Endotracheal Airway Details        ETT size (mm): 8.0       Cuffed: yes       Successful intubation technique: direct laryngoscopy       DL Blade Type: [place] 2       Grade View of Cords: 2       Adjucts: tooth guard and stylet       Position: Right       Measured from: lips       Secured at (cm): 24       Bite block used: Oral Airway (at end of case)    Post intubation assessment        Placement verified by: capnometry, equal breath sounds and chest rise        Number of attempts at approach: 1       Number of other approaches attempted: 0       Secured with: silk tape       Ease of procedure: easy       Dentition: Intact and Unchanged    Medication(s) Administered   Medication Administration Time: 8/8/2022 10:45 AM

## 2022-08-08 NOTE — ANESTHESIA CARE TRANSFER NOTE
[facility]    Procedure: Procedure(s):  DAVINCI ROBOTIC RADICAL PROSTATECTOMY AND BILATERAL PELVIC LYMPH NODE DISSECTION       Diagnosis: Malignant neoplasm of prostate (H) [C61]  Diagnosis Additional Information: No value filed.    Anesthesia Type:   General     Note:    Oropharynx: oropharynx clear of all foreign objects and spontaneously breathing  Level of Consciousness: awake and drowsy  Oxygen Supplementation: face mask  Level of Supplemental Oxygen (L/min / FiO2): 8  Independent Airway: airway patency satisfactory and stable  Dentition: dentition unchanged  Vital Signs Stable: post-procedure vital signs reviewed and stable  Report to RN Given: handoff report given  Patient transferred to: PACU  Comments: Volatile agents turned off, muscle relaxant reversed, 4/4 twitches with sustained tetany. Pt breathing spontaneously with adequate tidal volumes, following commands, gently suctioned oropharynx, extubated without issue. 10LPM O2 applied via face mask.Transported by CRNA and RN to recovery.    Handoff Report: Identifed the Patient, Identified the Reponsible Provider, Reviewed the pertinent medical history, Discussed the surgical course, Reviewed Intra-OP anesthesia mangement and issues during anesthesia, Set expectations for post-procedure period and Allowed opportunity for questions and acknowledgement of understanding      Vitals:  Vitals Value Taken Time   /72 08/08/22 1338   Temp 36.4  C (97.6  F) 08/08/22 1337   Pulse 56 08/08/22 1339   Resp 0 08/08/22 1339   SpO2 100 % 08/08/22 1339   Vitals shown include unvalidated device data.    Electronically Signed By: TRAVIS Lawrence CRNA  August 8, 2022  1:40 PM

## 2022-08-08 NOTE — PHARMACY-ADMISSION MEDICATION HISTORY
Pharmacy Note - Admission Medication History   ______________________________________________________________________    Prior To Admission (PTA) med list completed and updated in EMR.       PTA Med List   Medication Sig Last Dose     co-enzyme Q-10 100 MG CAPS capsule Take 100 mg by mouth daily 8/7/2022     multivitamin w/minerals (THERA-VIT-M) tablet Take 1 tablet by mouth daily 8/7/2022     OMEGA-3 FATTY ACIDS PO Take 300 mg by mouth daily 8/7/2022     vitamin C (ASCORBIC ACID) 100 MG tablet Take 200 mg by mouth daily 8/7/2022       Information source(s): Patient    Method of interview communication: in-person    Patient was asked about OTC/herbal products specifically.  PTA med list reflects this.    Based on the pharmacist's assessment, the PTA med list information appears reliable    Allergies were reviewed, assessed, and updated with the patient.      Patient does not use any multi-dose medications prior to admission.     Thank you for the opportunity to participate in the care of this patient.      Lali Allen RPH     8/8/2022     9:37 AM

## 2022-08-08 NOTE — ANESTHESIA POSTPROCEDURE EVALUATION
[facility]    Procedure: Procedure(s):  DAVINCI ROBOTIC RADICAL PROSTATECTOMY AND BILATERAL PELVIC LYMPH NODE DISSECTION       Anesthesia Type:  General    Note:  Disposition: Inpatient   Postop Pain Control: Uneventful            Sign Out: Well controlled pain   PONV:    Neuro/Psych: Uneventful            Sign Out: Acceptable/Baseline neuro status   Airway/Respiratory: Uneventful            Sign Out: Acceptable/Baseline resp. status   CV/Hemodynamics: Uneventful            Sign Out: Acceptable CV status; No obvious hypovolemia; No obvious fluid overload   Other NRE: NONE   DID A NON-ROUTINE EVENT OCCUR? No           Last vitals:  Vitals Value Taken Time   /70 08/08/22 1430   Temp 36.4  C (97.6  F) 08/08/22 1337   Pulse 65 08/08/22 1432   Resp 15 08/08/22 1432   SpO2 99 % 08/08/22 1432   Vitals shown include unvalidated device data.    Electronically Signed By: Alicia Shelton MD  August 8, 2022  2:34 PM

## 2022-08-08 NOTE — INTERVAL H&P NOTE
I have reviewed the surgical (or preoperative) H&P that is linked to this encounter, and examined the patient. There are no significant changes    Mikey Villareal MD        Clinical Conditions Present on Arrival:  Clinically Significant Risk Factors Present on Admission                  # Thrombocytopenia: Plts = 119 10e3/uL (Ref range: 150 - 450 10e3/uL) on admission, will monitor for bleeding

## 2022-08-09 LAB
ANION GAP SERPL CALCULATED.3IONS-SCNC: 8 MMOL/L (ref 5–18)
BUN SERPL-MCNC: 13 MG/DL (ref 8–22)
CALCIUM SERPL-MCNC: 9 MG/DL (ref 8.5–10.5)
CHLORIDE BLD-SCNC: 105 MMOL/L (ref 98–107)
CO2 SERPL-SCNC: 26 MMOL/L (ref 22–31)
CREAT SERPL-MCNC: 1.04 MG/DL (ref 0.7–1.3)
GFR SERPL CREATININE-BSD FRML MDRD: 78 ML/MIN/1.73M2
GLUCOSE BLD-MCNC: 116 MG/DL (ref 70–125)
GLUCOSE BLDC GLUCOMTR-MCNC: 117 MG/DL (ref 70–99)
HGB BLD-MCNC: 13.8 G/DL (ref 13.3–17.7)
PATH REPORT.COMMENTS IMP SPEC: ABNORMAL
PATH REPORT.COMMENTS IMP SPEC: ABNORMAL
PATH REPORT.COMMENTS IMP SPEC: YES
PATH REPORT.FINAL DX SPEC: ABNORMAL
PATH REPORT.GROSS SPEC: ABNORMAL
PATH REPORT.MICROSCOPIC SPEC OTHER STN: ABNORMAL
PATH REPORT.RELEVANT HX SPEC: ABNORMAL
PATHOLOGY SYNOPTIC REPORT: ABNORMAL
PHOTO IMAGE: ABNORMAL
POTASSIUM BLD-SCNC: 3.9 MMOL/L (ref 3.5–5)
SODIUM SERPL-SCNC: 139 MMOL/L (ref 136–145)

## 2022-08-09 PROCEDURE — 96361 HYDRATE IV INFUSION ADD-ON: CPT

## 2022-08-09 PROCEDURE — 250N000013 HC RX MED GY IP 250 OP 250 PS 637: Performed by: UROLOGY

## 2022-08-09 PROCEDURE — 80048 BASIC METABOLIC PNL TOTAL CA: CPT | Performed by: UROLOGY

## 2022-08-09 PROCEDURE — 88305 TISSUE EXAM BY PATHOLOGIST: CPT | Mod: 26 | Performed by: PATHOLOGY

## 2022-08-09 PROCEDURE — 88309 TISSUE EXAM BY PATHOLOGIST: CPT | Mod: 26 | Performed by: PATHOLOGY

## 2022-08-09 PROCEDURE — 85018 HEMOGLOBIN: CPT | Performed by: UROLOGY

## 2022-08-09 PROCEDURE — 120N000001 HC R&B MED SURG/OB

## 2022-08-09 PROCEDURE — 250N000009 HC RX 250: Performed by: NURSE PRACTITIONER

## 2022-08-09 PROCEDURE — 258N000003 HC RX IP 258 OP 636: Performed by: UROLOGY

## 2022-08-09 PROCEDURE — 96372 THER/PROPH/DIAG INJ SC/IM: CPT | Performed by: UROLOGY

## 2022-08-09 PROCEDURE — 36415 COLL VENOUS BLD VENIPUNCTURE: CPT | Performed by: UROLOGY

## 2022-08-09 PROCEDURE — G0378 HOSPITAL OBSERVATION PER HR: HCPCS

## 2022-08-09 PROCEDURE — 250N000011 HC RX IP 250 OP 636: Performed by: UROLOGY

## 2022-08-09 RX ORDER — ACETAMINOPHEN 325 MG/1
325-650 TABLET ORAL EVERY 4 HOURS PRN
COMMUNITY
Start: 2022-08-11

## 2022-08-09 RX ORDER — MULTIPLE VITAMINS W/ MINERALS TAB 9MG-400MCG
1 TAB ORAL DAILY
COMMUNITY
Start: 2022-08-22

## 2022-08-09 RX ORDER — GINSENG 100 MG
CAPSULE ORAL 3 TIMES DAILY
Status: DISCONTINUED | OUTPATIENT
Start: 2022-08-09 | End: 2022-08-10 | Stop reason: HOSPADM

## 2022-08-09 RX ORDER — GINSENG 100 MG
CAPSULE ORAL 3 TIMES DAILY
COMMUNITY
Start: 2022-08-09

## 2022-08-09 RX ORDER — OXYCODONE HYDROCHLORIDE 5 MG/1
5 TABLET ORAL EVERY 6 HOURS PRN
Qty: 13 TABLET | Refills: 0 | Status: SHIPPED | OUTPATIENT
Start: 2022-08-09

## 2022-08-09 RX ORDER — UBIDECARENONE 100 MG
100 CAPSULE ORAL DAILY
COMMUNITY
Start: 2022-08-22

## 2022-08-09 RX ORDER — OMEGA-3/DHA/EPA/FISH OIL 300-1000MG
1 CAPSULE ORAL DAILY
COMMUNITY
Start: 2022-08-22

## 2022-08-09 RX ORDER — POLYETHYLENE GLYCOL 3350 17 G/17G
17 POWDER, FOR SOLUTION ORAL DAILY
Qty: 255 G | Refills: 0 | Status: SHIPPED | OUTPATIENT
Start: 2022-08-09 | End: 2022-08-23

## 2022-08-09 RX ADMIN — ACETAMINOPHEN 975 MG: 325 TABLET ORAL at 14:47

## 2022-08-09 RX ADMIN — OXYCODONE HYDROCHLORIDE 5 MG: 5 TABLET ORAL at 11:42

## 2022-08-09 RX ADMIN — SODIUM CHLORIDE: 9 INJECTION, SOLUTION INTRAVENOUS at 09:08

## 2022-08-09 RX ADMIN — SENNOSIDES AND DOCUSATE SODIUM 1 TABLET: 50; 8.6 TABLET ORAL at 09:00

## 2022-08-09 RX ADMIN — HEPARIN SODIUM 5000 UNITS: 10000 INJECTION, SOLUTION INTRAVENOUS; SUBCUTANEOUS at 22:11

## 2022-08-09 RX ADMIN — SENNOSIDES AND DOCUSATE SODIUM 1 TABLET: 50; 8.6 TABLET ORAL at 21:11

## 2022-08-09 RX ADMIN — BACITRACIN: 500 OINTMENT TOPICAL at 16:06

## 2022-08-09 RX ADMIN — HEPARIN SODIUM 5000 UNITS: 10000 INJECTION, SOLUTION INTRAVENOUS; SUBCUTANEOUS at 06:35

## 2022-08-09 RX ADMIN — ACETAMINOPHEN 975 MG: 325 TABLET ORAL at 22:10

## 2022-08-09 RX ADMIN — POLYETHYLENE GLYCOL 3350 17 G: 17 POWDER, FOR SOLUTION ORAL at 09:00

## 2022-08-09 RX ADMIN — OXYCODONE HYDROCHLORIDE 5 MG: 5 TABLET ORAL at 09:48

## 2022-08-09 RX ADMIN — BACITRACIN: 500 OINTMENT TOPICAL at 21:12

## 2022-08-09 RX ADMIN — ACETAMINOPHEN 975 MG: 325 TABLET ORAL at 06:35

## 2022-08-09 RX ADMIN — OXYCODONE HYDROCHLORIDE 10 MG: 5 TABLET ORAL at 16:17

## 2022-08-09 RX ADMIN — HEPARIN SODIUM 5000 UNITS: 10000 INJECTION, SOLUTION INTRAVENOUS; SUBCUTANEOUS at 14:47

## 2022-08-09 ASSESSMENT — ACTIVITIES OF DAILY LIVING (ADL)
ADLS_ACUITY_SCORE: 22
DEPENDENT_IADLS:: INDEPENDENT

## 2022-08-09 NOTE — PLAN OF CARE
Problem: Plan of Care - These are the overarching goals to be used throughout the patient stay.    Goal: Optimal Comfort and Wellbeing  Outcome: Ongoing, Progressing  Intervention: Monitor Pain and Promote Comfort  Recent Flowsheet Documentation  Taken 8/9/2022 0948 by Hiwot Jay RN  Pain Management Interventions: medication (see MAR)     Problem: Pain Acute  Goal: Acceptable Pain Control and Functional Ability  Outcome: Ongoing, Progressing  Intervention: Develop Pain Management Plan  Recent Flowsheet Documentation  Taken 8/9/2022 0948 by Hwiot Jay, RN  Pain Management Interventions: medication (see MAR)  Intervention: Prevent or Manage Pain  Recent Flowsheet Documentation  Taken 8/9/2022 1000 by Hiwot Jay RN  Medication Review/Management: medications reviewed   Goal Outcome Evaluation:    Pt c/o pain on abdomen and incision site. Requested for pain medication.  Gave Oxycodone 5 mg.  Pt still c/o pain after 1 1/2 hour.  Gave another 5 mg of Oxycodone for a total of 10 mg.  Pt went for a walk in the hallway with wife.  Noted a small slit on abdomen incision.  Steri-step applied.  Will cont to monitor.

## 2022-08-09 NOTE — PROGRESS NOTES
[facility] - POSTOP PROGRESS NOTE    PLACE OF SERVICE:  [facility]     SURGERY: POD #1 after DaVinci Robotic Assisted Radical Prostatectomy and Bilateral Pelvic Lymph Node Dissection by Dr Mikey Villareal for prostate cancer     SUBJECTIVE:   Events: no acute events overnight    Patient c/o some abdominal pain, taking oxycodone. Not yet passing gas. Denies fever, chills, SOB, chest pain, nausea, vomiting.Tolerating full liquid diet. Ambulating in halls. Not yet passing gas.     OBJECTIVE:  PHYSICAL EXAM  Vital signs:  Temp: 98.8  F (37.1  C) Temp src: Oral BP: 118/66 Pulse: 61   Resp: 18 SpO2: 95 % O2 Device: None (Room air) Oxygen Delivery: 8 LPM   Weight: 79.8 kg (176 lb)  There is no height or weight on file to calculate BMI.    Gen: nad, alert  Neuro: A&O x 3. Moving all extremities equally.   Resp: Reg resp rate/depth.   Abdomen: appropriately tender, minimally distended, no rebound or peritoneal signs  Incisions: C/D/I, closed with subcutaneous suture/skin glue, proximal midline incision with 3 small steri-strips, no ecchymosis noted   : 20 fr catheter in place, draining clear yellow urine.   LE: CWMS intact. No bilateral LE edema noted.      LABS:  No results found for: INR   Lab Results   Component Value Date    WBC 2.9 08/08/2022     Lab Results   Component Value Date    HGB 13.8 08/09/2022     Lab Results   Component Value Date     08/08/2022     Creatinine   Date Value Ref Range Status   08/09/2022 1.04 0.70 - 1.30 mg/dL Final     Sodium   Date Value Ref Range Status   08/09/2022 139 136 - 145 mmol/L Final     Potassium   Date Value Ref Range Status   08/09/2022 3.9 3.5 - 5.0 mmol/L Final       Lab Results: personally reviewed.     ASSESSMENT/PLAN: POD #1 after DaVinci Robotic Assisted Radical Prostatectomy and Bilateral Pelvic Lymph Node Dissection by Dr Mikey Villareal for prostate cancer     - Diet - full liquid diet, maintain until passing gas regularly, then may advance to regular  diet. Did ok homemade peanut butter sandwich wife brought for patient.   - Pain - Scheduled tylenol, prn dilaudid IV, prn oxycodone po.   - Catheter - urine clear yellow, output good. Maintain at discharge.  - Activity - encourage ambulation and incentive spirometer   - DVT prophylaxis: subcutaneous heparin, SCDs, ambulation  - Anticipated discharge: Later today or tomorrow.       Discussed patient with Dr Mikey Lopez, APRN, CNP  [facility]   [phone]    ADDENDUM, 1717:   Called back and spoke with patient's RN. Working to get better pain control with medications. Not yet passing gas. Will keep tonight, anticipate discharge 8/10/22.     Maninder Lopez, CNP

## 2022-08-09 NOTE — DISCHARGE SUMMARY
[facility] DISCHARGE SUMMARY    Name: Mikhail Espino  : 1955  MRN: [id number]  PCP: Jd Aceves    Place of service: [facility]    Admission date: 2022   Discharge date: 8/10/2022     Surgeon: Dr. Mikey Villareal     Surgical Procedure: DaVinci Robotic Assisted Radical Prostatectomy and Bilateral Pelvic Lymph Node Dissection    Date of surgery: 2022    Principal Diagnosis at Discharge:   Malignant neoplasm of prostate (H) [C61]: Moderately differentiated prostatic adenocarcinoma    Other diagnosis addressed during hospitalization:  None    Consults:  None    Diagnostic Studies :  None    Complications while in the Hospital:  None    Physical Exam:  Temp: 98.4  F (36.9  C) Temp src: Oral BP: 134/69 Pulse: 62   Resp: 18 SpO2: 100 % O2 Device: None (Room air)      Gen: nad, alert  Neuro: A&O x 3. Moving all extremities equally.   Resp: Reg resp rate/depth.   Abdomen: appropriately tender, minimally distended, no rebound or peritoneal signs  Incisions: C/D/I, closed with subcutaneous suture/skin glue, proximal midline incision with 3 small steri-strips, no signficant ecchymosis noted   : 20 fr catheter in place, draining clear yellow urine, good output.   LE: CWMS intact. No bilateral LE edema noted.     Brief history of hospital course:  This is a 67 year old male admitted for Malignant neoplasm of prostate (H) [C61]. Patient underwent above procedure. Patient tolerated the procedure well. Post operative course was unremarkable. By the day of discharge, the patient was ambulatory, tolerating diet, pain was controlled with oral analgesics, labs and vitals stable, passing flatus. Discharged home with thomas catheter.     Labs:  Hemoglobin   Date Value Ref Range Status   2022 13.8 13.3 - 17.7 g/dL Final     Platelet Count   Date Value Ref Range Status   2022 119 (L) 150 - 450 10e3/uL Final     WBC Count   Date Value Ref Range Status   2022 2.9 (L) 4.0 - 11.0 10e3/uL  Final     Creatinine   Date Value Ref Range Status   08/09/2022 1.04 0.70 - 1.30 mg/dL Final     Potassium   Date Value Ref Range Status   08/09/2022 3.9 3.5 - 5.0 mmol/L Final     No results found for: INR     Surgical Pathology:   Final Diagnosis   A) RIGHT OBTURATOR LYMPH NODES, LYMPH NODE DISSECTION:        -NO EVIDENCE OF METASTATIC CARCINOMA (0 OF 2)    B) LEFT OBTURATOR LYMPH NODES, LYMPH NODE DISSECTION:        -NO EVIDENCE OF METASTATIC CARCINOMA (0 OF 2)    C) PROSTATE, SEMINAL VESICLES AND VAS DEFERENS, ROBOTIC RADICAL PROSTATECTOMY:        1.  MODERATELY DIFFERENTIATED PROSTATIC ADENOCARCINOMA (SEE SYNOPTIC REPORT BELOW)              A.  DEMAROC'S GRADE 3+4 (SCORE 7); GRADE GROUP 2              B.  BILATERAL TUMOR INVOLVING INFERIOR AND APICAL RIGHT LOBE, SUPERIOR RIGHT LOBE AND SUPERIOR                    TO APICAL LEFT LOBE; APPROXIMATELY 20% GLAND INVOLVEMENT BY TUMOR              C.  EXTRAGLANDULAR SOFT TISSUE EXTENSION AND TUMOR EXTENSION TO INKED PERIPHERAL MARGINS NOT                    IDENTIFIED              D.  STAGE: pT2 pN0        2.  MULTIFOCAL HIGH-GRADE PIN        3.  BENIGN NODULAR GLANDULAR AND STROMAL HYPERPLASIA        4.  MODERATE ATHEROSCLEROSIS, PERIPROSTATIC ARTERIES     DISPOSITION: Home    DISCHARGE CONDITION: Good/Stable    DISCHARGE MEDICATIONS:      Review of your medicines      START taking      Dose / Directions   acetaminophen 325 MG tablet  Commonly known as: TYLENOL      Dose: 325-650 mg  Start taking on: August 11, 2022  Take 1-2 tablets (325-650 mg) by mouth every 4 hours as needed for mild pain (For optimal non-opioid multimodal pain management to improve pain control.)  Refills: 0     bacitracin 500 UNIT/GM Oint      Apply topically 3 times daily Apply to tip of penis while thomsa catheter is present.  Refills: 0     oxyCODONE 5 MG tablet  Commonly known as: ROXICODONE      Dose: 5 mg  Take 1 tablet (5 mg) by mouth every 6 hours as needed for moderate to severe  pain  Quantity: 13 tablet  Refills: 0     polyethylene glycol 17 GM/Dose powder  Commonly known as: MIRALAX      Dose: 17 g  Take 17 g by mouth daily for 14 days Discontinue if your stools become loose.  Quantity: 255 g  Refills: 0        CONTINUE these medicines which may have CHANGED, or have new prescriptions. If we are uncertain of the size of tablets/capsules you have at home, strength may be listed as something that might have changed.      Dose / Directions   co-enzyme Q-10 100 MG Caps capsule  This may have changed: These instructions start on August 22, 2022. If you are unsure what to do until then, ask your doctor or other care provider.      Dose: 100 mg  Start taking on: August 22, 2022  Take 1 capsule (100 mg) by mouth daily  Refills: 0     fish oil-omega-3 fatty acids 1000 MG capsule  This may have changed:     medication strength    how much to take    additional instructions    These instructions start on August 22, 2022. If you are unsure what to do until then, ask your doctor or other care provider.      Dose: 1 g  Start taking on: August 22, 2022  Take 1 capsule (1 g) by mouth daily You may take your normal dose but hold for 2 weeks from 8/8/22 due to increase risk for bleeding.  Refills: 0     multivitamin w/minerals tablet  This may have changed: These instructions start on August 22, 2022. If you are unsure what to do until then, ask your doctor or other care provider.      Dose: 1 tablet  Start taking on: August 22, 2022  Take 1 tablet by mouth daily  Refills: 0        CONTINUE these medicines which have NOT CHANGED      Dose / Directions   vitamin C 100 MG tablet  Commonly known as: ASCORBIC ACID      Dose: 200 mg  Take 200 mg by mouth daily  Refills: 0           Where to get your medicines      These medications were sent to [id number] - [place], [place] - [address] AT [facility] [address]  [address], [place] [address]    Phone: [phone]      oxyCODONE 5 MG tablet    polyethylene glycol 17 GM/Dose powder     Some of these will need a paper prescription and others can be bought over the counter. Ask your nurse if you have questions.    You don't need a prescription for these medications    acetaminophen 325 MG tablet    bacitracin 500 UNIT/GM Oint    co-enzyme Q-10 100 MG Caps capsule    fish oil-omega-3 fatty acids 1000 MG capsule    multivitamin w/minerals tablet         DISCHARGE PLAN:   - Follow up with Dr. Mieky Villareal as scheduled prior to your procedure   - Follow up with Jd Aceves as needed.   - Take medication as prescribed, avoid anticoagulants per surgeon and PCP recommendations.   - Wound / drain care: As directed prior to discharge  - Physical activity: As tolerated, no heavy lifting. No driving while taking narcotics.   - Diet: Maintain regular diet as long as you are passing gas regularly and remain without nausea.  - Medication: please review discharge Med req/AVS  - Warning signs discussed with patient about when to call the clinic/hospital  - All questions and concerns were answered for the patient prior to discharge  - Patient verbalized understanding.     Discussed patient with Dr. Mikey Villareal on day of discharge.     Maninder Lopez, APRN, CNP  [facility]   [phone]     I saw the patient on the date of discharge  Total time spent for discharge on date of discharge: 20 minutes    Physician(s) in addition to primary physician who should receive a copy:  CC1: Jd Aceves           ?

## 2022-08-09 NOTE — CONSULTS
Care Management Initial Consult    General Information  Assessment completed with: Patient,    Type of CM/SW Visit: Initial Assessment    Primary Care Provider verified and updated as needed:     Readmission within the last 30 days: no previous admission in last 30 days      Reason for Consult: discharge planning  Advance Care Planning:            Communication Assessment  Patient's communication style: spoken language (English or Bilingual)    Hearing Difficulty or Deaf: no   Wear Glasses or Blind: yes    Cognitive  Cognitive/Neuro/Behavioral: WDL                      Living Environment:   People in home: spouse     Current living Arrangements: house      Able to return to prior arrangements: yes       Family/Social Support:  Care provided by: self  Provides care for: no one  Marital Status:   Wife          Description of Support System: Supportive         Current Resources:   Patient receiving home care services: No     Community Resources: None  Equipment currently used at home: none  Supplies currently used at home: None    Employment/Financial:  Employment Status: employed full-time        Financial Concerns:             Lifestyle & Psychosocial Needs:  Social Determinants of Health     Tobacco Use: Low Risk      Smoking Tobacco Use: Never Smoker     Smokeless Tobacco Use: Never Used   Alcohol Use: Not on file   Financial Resource Strain: Not on file   Food Insecurity: Not on file   Transportation Needs: Not on file   Physical Activity: Not on file   Stress: Not on file   Social Connections: Not on file   Intimate Partner Violence: Not on file   Depression: Not on file   Housing Stability: Not on file       Functional Status:  Prior to admission patient needed assistance:   Dependent ADLs:: Independent  Dependent IADLs:: Independent       Mental Health Status:          Chemical Dependency Status:                Values/Beliefs:  Spiritual, Cultural Beliefs, Advent Practices, Values that affect care:                  Additional Information:  KARINA Stone met with patient, patient report being normally independent at home. Patient denies any discharge needs.     Humera Johnston RN

## 2022-08-10 VITALS
HEART RATE: 62 BPM | RESPIRATION RATE: 18 BRPM | TEMPERATURE: 98.4 F | OXYGEN SATURATION: 100 % | SYSTOLIC BLOOD PRESSURE: 134 MMHG | DIASTOLIC BLOOD PRESSURE: 69 MMHG | WEIGHT: 176 LBS

## 2022-08-10 LAB — GLUCOSE BLDC GLUCOMTR-MCNC: 92 MG/DL (ref 70–99)

## 2022-08-10 PROCEDURE — G0378 HOSPITAL OBSERVATION PER HR: HCPCS

## 2022-08-10 PROCEDURE — 250N000011 HC RX IP 250 OP 636: Performed by: UROLOGY

## 2022-08-10 PROCEDURE — 250N000013 HC RX MED GY IP 250 OP 250 PS 637: Performed by: UROLOGY

## 2022-08-10 PROCEDURE — 96372 THER/PROPH/DIAG INJ SC/IM: CPT | Performed by: UROLOGY

## 2022-08-10 RX ADMIN — ACETAMINOPHEN 975 MG: 325 TABLET ORAL at 14:42

## 2022-08-10 RX ADMIN — ACETAMINOPHEN 975 MG: 325 TABLET ORAL at 05:50

## 2022-08-10 RX ADMIN — SENNOSIDES AND DOCUSATE SODIUM 1 TABLET: 50; 8.6 TABLET ORAL at 08:10

## 2022-08-10 RX ADMIN — HEPARIN SODIUM 5000 UNITS: 10000 INJECTION, SOLUTION INTRAVENOUS; SUBCUTANEOUS at 05:57

## 2022-08-10 RX ADMIN — BACITRACIN: 500 OINTMENT TOPICAL at 08:09

## 2022-08-10 RX ADMIN — OXYCODONE HYDROCHLORIDE 5 MG: 5 TABLET ORAL at 00:54

## 2022-08-10 RX ADMIN — BISACODYL 10 MG: 10 SUPPOSITORY RECTAL at 12:06

## 2022-08-10 RX ADMIN — POLYETHYLENE GLYCOL 3350 17 G: 17 POWDER, FOR SOLUTION ORAL at 08:09

## 2022-08-10 ASSESSMENT — ACTIVITIES OF DAILY LIVING (ADL)
ADLS_ACUITY_SCORE: 22

## 2022-08-10 NOTE — PLAN OF CARE
Problem: Pain Acute  Goal: Acceptable Pain Control and Functional Ability  Outcome: Ongoing, Progressing  Intervention: Develop Pain Management Plan  Recent Flowsheet Documentation  Taken 8/9/2022 1617 by Page Brooks RN  Pain Management Interventions: medication (see MAR)  Intervention: Prevent or Manage Pain  Recent Flowsheet Documentation  Taken 8/9/2022 1653 by Page Brooks, RN  Medication Review/Management: medications reviewed   Goal Outcome Evaluation:      Patient complained of pain 7/10, oxycodone prn was helpful, ambulated in hallways x2 with spouse, has not been passing gas yet, no bm, abdominal incisions clean, dry and intact, thomas intact and patent, urine pink, urology called, person writer spoke with said she was going to order CT of the abdomen, no order noted, and no MD note seen on patient's chart.

## 2022-08-10 NOTE — PLAN OF CARE
Goal Outcome Evaluation:      Problem: Plan of Care - These are the overarching goals to be used throughout the patient stay.    Goal: Optimal Comfort and Wellbeing  Outcome: Ongoing, Progressing

## 2022-08-10 NOTE — PLAN OF CARE
"  Problem: Plan of Care - These are the overarching goals to be used throughout the patient stay.    Goal: Plan of Care Review/Shift Note  Description: The Plan of Care Review/Shift note should be completed every shift.  The Outcome Evaluation is a brief statement about your assessment that the patient is improving, declining, or no change.  This information will be displayed automatically on your shift note.  Outcome: Met  Goal: Patient-Specific Goal (Individualized)  Description: You can add care plan individualizations to a care plan. Examples of Individualization might be:  \"Parent requests to be called daily at 9am for status\", \"I have a hard time hearing out of my right ear\", or \"Do not touch me to wake me up as it startles me\".  Outcome: Met  Goal: Absence of Hospital-Acquired Illness or Injury  Outcome: Met  Intervention: Identify and Manage Fall Risk  Recent Flowsheet Documentation  Taken 8/10/2022 1000 by Hiwot Jay RN  Safety Promotion/Fall Prevention:   activity supervised   lighting adjusted   nonskid shoes/slippers when out of bed   patient and family education   safety round/check completed  Intervention: Prevent Skin Injury  Recent Flowsheet Documentation  Taken 8/10/2022 0900 by Hiwot Jay RN  Body Position: position changed independently  Intervention: Prevent and Manage VTE (Venous Thromboembolism) Risk  Recent Flowsheet Documentation  Taken 8/10/2022 1000 by Hiwot Jay RN  VTE Prevention/Management: SCDs (sequential compression devices) off  Taken 8/10/2022 0900 by Hiwot Jay RN  Activity Management:   ambulated in sellers   ambulated in room   ambulated outside   ambulated to bathroom  Goal: Optimal Comfort and Wellbeing  Outcome: Met  Intervention: Monitor Pain and Promote Comfort  Recent Flowsheet Documentation  Taken 8/10/2022 0900 by Hiwot Jay RN  Pain Management Interventions: medication (see MAR)  Goal: Readiness for Transition of Care  Outcome: Met     Problem: Pain " Acute  Goal: Acceptable Pain Control and Functional Ability  Outcome: Met  Intervention: Develop Pain Management Plan  Recent Flowsheet Documentation  Taken 8/10/2022 0900 by Hiwot Jay, RN  Pain Management Interventions: medication (see MAR)  Intervention: Prevent or Manage Pain  Recent Flowsheet Documentation  Taken 8/10/2022 1000 by Hiwot Jay, RN  Medication Review/Management: medications reviewed   Goal Outcome Evaluation:    Discharge instructions and follow up appointments reviewed with pt.  Thacker catheter teaching provided with pt's wife presents.  All belongings are packed and sent with.  Peripheral iv saw removed.  Pt was transported to home by pt's wife.

## 2022-08-10 NOTE — UTILIZATION REVIEW
Admission Status; Secondary Review Determination       Under the authority of the Utilization Management Committee, the utilization review process indicated a secondary review on the above patient. The review outcome is based on review of the medical records, discussions with staff, and applying clinical experience noted on the date of the review.     (x) Outpatient Status with extended recovery is appropriate - This patient does not meet hospital inpatient criteria. If this patient's primary payer is Medicare and was admitted as an inpatient, Condition Code 44 should be used and patient status changed to outpatient recovery.    RATIONALE FOR DETERMINATION     Mr. Espino is a 67 years old man who underwent a robotic assisted total prostatectomy and bilateral pelvic lymph node dissection on 8/8/22. . Patient was admitted to the hospital after the procedure. This surgical procedure is not on the CMS inpatient list. No documented complications or unexpected recovery. Patient can be safely  monitored for bleeding and recover in outpatient/extended recovery setting.   The severity of illness, intensity of service provided, expected LOS and risk for adverse outcome doesn't meet inpatient hospital admission. His pain is better controlled today on oral pain meds and he will be discharging home later today.    The information on this document is developed by the utilization review team in order for the business office to ensure compliance. This only denotes the appropriateness of proper admission status and does not reflect the quality of care rendered.   The definitions of Inpatient Status and Observation Status used in making the determination above are those provided in the CMS Coverage Manual, Chapter 1 and Chapter 6, section 70.4.       Sincerely,       Regina Terry, DO  Utilization Review  Physician Advisor  [facility].

## 2023-05-17 ENCOUNTER — HOSPITAL ENCOUNTER (EMERGENCY)
Facility: HOSPITAL | Age: 68
Discharge: HOME | End: 2023-05-17
Attending: EMERGENCY MEDICINE | Admitting: EMERGENCY MEDICINE
Payer: COMMERCIAL

## 2023-05-17 ENCOUNTER — APPOINTMENT (OUTPATIENT)
Dept: RADIOLOGY | Facility: HOSPITAL | Age: 68
End: 2023-05-17
Attending: EMERGENCY MEDICINE
Payer: COMMERCIAL

## 2023-05-17 VITALS
TEMPERATURE: 98.7 F | OXYGEN SATURATION: 100 % | HEART RATE: 60 BPM | DIASTOLIC BLOOD PRESSURE: 75 MMHG | SYSTOLIC BLOOD PRESSURE: 139 MMHG | RESPIRATION RATE: 18 BRPM

## 2023-05-17 DIAGNOSIS — D69.6 THROMBOCYTOPENIA (H): ICD-10-CM

## 2023-05-17 DIAGNOSIS — R55 NEAR SYNCOPE: ICD-10-CM

## 2023-05-17 PROBLEM — D12.6 ADENOMATOUS POLYP OF COLON: Status: ACTIVE | Noted: 2019-11-15

## 2023-05-17 PROBLEM — R19.5 HEME POSITIVE STOOL: Status: ACTIVE | Noted: 2019-10-22

## 2023-05-17 PROBLEM — C61 PROSTATE CANCER (H): Status: ACTIVE | Noted: 2022-06-14

## 2023-05-17 LAB
ALBUMIN SERPL BCG-MCNC: 4.5 G/DL (ref 3.5–5.2)
ALBUMIN UR-MCNC: 10 MG/DL
ALP SERPL-CCNC: 74 U/L (ref 40–129)
ALT SERPL W P-5'-P-CCNC: 24 U/L (ref 10–50)
ANION GAP SERPL CALCULATED.3IONS-SCNC: 11 MMOL/L (ref 7–15)
APPEARANCE UR: CLEAR
AST SERPL W P-5'-P-CCNC: 50 U/L (ref 10–50)
BASOPHILS # BLD AUTO: 0 10E3/UL (ref 0–0.2)
BASOPHILS NFR BLD AUTO: 1 %
BILIRUB SERPL-MCNC: 0.7 MG/DL
BILIRUB UR QL STRIP: NEGATIVE
BUN SERPL-MCNC: 19.6 MG/DL (ref 8–23)
CALCIUM SERPL-MCNC: 9.8 MG/DL (ref 8.8–10.2)
CHLORIDE SERPL-SCNC: 103 MMOL/L (ref 98–107)
COLOR UR AUTO: YELLOW
CREAT SERPL-MCNC: 1.08 MG/DL (ref 0.67–1.17)
CRP SERPL-MCNC: <3 MG/L
D DIMER PPP FEU-MCNC: 0.41 UG/ML FEU (ref 0–0.5)
DEPRECATED HCO3 PLAS-SCNC: 25 MMOL/L (ref 22–29)
EOSINOPHIL # BLD AUTO: 0 10E3/UL (ref 0–0.7)
EOSINOPHIL NFR BLD AUTO: 1 %
ERYTHROCYTE [DISTWIDTH] IN BLOOD BY AUTOMATED COUNT: 13.7 % (ref 10–15)
GFR SERPL CREATININE-BSD FRML MDRD: 74 ML/MIN/1.73M2
GLUCOSE SERPL-MCNC: 100 MG/DL (ref 70–99)
GLUCOSE UR STRIP-MCNC: NEGATIVE MG/DL
HCT VFR BLD AUTO: 45 % (ref 40–53)
HGB BLD-MCNC: 15.2 G/DL (ref 13.3–17.7)
HGB UR QL STRIP: NEGATIVE
HYALINE CASTS: 1 /LPF
IMM GRANULOCYTES # BLD: 0 10E3/UL
IMM GRANULOCYTES NFR BLD: 1 %
KETONES UR STRIP-MCNC: NEGATIVE MG/DL
LEUKOCYTE ESTERASE UR QL STRIP: NEGATIVE
LYMPHOCYTES # BLD AUTO: 1.1 10E3/UL (ref 0.8–5.3)
LYMPHOCYTES NFR BLD AUTO: 18 %
MAGNESIUM SERPL-MCNC: 2.1 MG/DL (ref 1.7–2.3)
MCH RBC QN AUTO: 30.4 PG (ref 26.5–33)
MCHC RBC AUTO-ENTMCNC: 33.8 G/DL (ref 31.5–36.5)
MCV RBC AUTO: 90 FL (ref 78–100)
MONOCYTES # BLD AUTO: 0.4 10E3/UL (ref 0–1.3)
MONOCYTES NFR BLD AUTO: 6 %
MUCOUS THREADS #/AREA URNS LPF: PRESENT /LPF
NEUTROPHILS # BLD AUTO: 4.8 10E3/UL (ref 1.6–8.3)
NEUTROPHILS NFR BLD AUTO: 73 %
NITRATE UR QL: NEGATIVE
NRBC # BLD AUTO: 0 10E3/UL
NRBC BLD AUTO-RTO: 0 /100
PH UR STRIP: 6 [PH] (ref 5–7)
PLATELET # BLD AUTO: 115 10E3/UL (ref 150–450)
POTASSIUM SERPL-SCNC: 4.2 MMOL/L (ref 3.4–5.3)
PROT SERPL-MCNC: 7.4 G/DL (ref 6.4–8.3)
RBC # BLD AUTO: 5 10E6/UL (ref 4.4–5.9)
RBC URINE: <1 /HPF
SODIUM SERPL-SCNC: 139 MMOL/L (ref 136–145)
SP GR UR STRIP: 1.02 (ref 1–1.03)
TROPONIN T SERPL HS-MCNC: 10 NG/L
UROBILINOGEN UR STRIP-MCNC: <2 MG/DL
WBC # BLD AUTO: 6.4 10E3/UL (ref 4–11)
WBC URINE: 1 /HPF

## 2023-05-17 PROCEDURE — 96360 HYDRATION IV INFUSION INIT: CPT

## 2023-05-17 PROCEDURE — 85014 HEMATOCRIT: CPT | Performed by: EMERGENCY MEDICINE

## 2023-05-17 PROCEDURE — 36415 COLL VENOUS BLD VENIPUNCTURE: CPT | Performed by: EMERGENCY MEDICINE

## 2023-05-17 PROCEDURE — 80053 COMPREHEN METABOLIC PANEL: CPT | Performed by: EMERGENCY MEDICINE

## 2023-05-17 PROCEDURE — 84484 ASSAY OF TROPONIN QUANT: CPT | Performed by: EMERGENCY MEDICINE

## 2023-05-17 PROCEDURE — 81001 URINALYSIS AUTO W/SCOPE: CPT | Performed by: EMERGENCY MEDICINE

## 2023-05-17 PROCEDURE — 93005 ELECTROCARDIOGRAM TRACING: CPT | Performed by: EMERGENCY MEDICINE

## 2023-05-17 PROCEDURE — 96361 HYDRATE IV INFUSION ADD-ON: CPT

## 2023-05-17 PROCEDURE — 99285 EMERGENCY DEPT VISIT HI MDM: CPT | Mod: 25

## 2023-05-17 PROCEDURE — 71046 X-RAY EXAM CHEST 2 VIEWS: CPT

## 2023-05-17 PROCEDURE — 85379 FIBRIN DEGRADATION QUANT: CPT | Performed by: EMERGENCY MEDICINE

## 2023-05-17 PROCEDURE — 83735 ASSAY OF MAGNESIUM: CPT | Performed by: EMERGENCY MEDICINE

## 2023-05-17 PROCEDURE — 258N000003 HC RX IP 258 OP 636: Performed by: EMERGENCY MEDICINE

## 2023-05-17 PROCEDURE — 86140 C-REACTIVE PROTEIN: CPT | Performed by: EMERGENCY MEDICINE

## 2023-05-17 RX ADMIN — SODIUM CHLORIDE 1000 ML: 9 INJECTION, SOLUTION INTRAVENOUS at 17:34

## 2023-05-17 ASSESSMENT — ENCOUNTER SYMPTOMS
FEVER: 0
FATIGUE: 1
DIAPHORESIS: 1
SORE THROAT: 0
COUGH: 0

## 2023-05-17 ASSESSMENT — ACTIVITIES OF DAILY LIVING (ADL)
ADLS_ACUITY_SCORE: 35

## 2023-05-17 NOTE — ED TRIAGE NOTES
"Pt arrives via [facility] for syncopal episode at home. Pt states he ate lunch and felt dizzy and diaphoretic afterwards. Pt endorses \"passing out\" denies striking head. Pt arrives VS yet diaphoretic. Denies pain. Pt was ambulatory on scene. BS for EMS 99  Pt notes increased stressors at home. Wife at bedside      Triage Assessment     Row Name 05/17/23 9572       Triage Assessment (Adult)    Airway WDL WDL       Respiratory WDL    Respiratory WDL WDL       Skin Circulation/Temperature WDL    Skin Circulation/Temperature WDL WDL       Cardiac WDL    Cardiac WDL WDL       Peripheral/Neurovascular WDL    Peripheral Neurovascular WDL WDL       Cognitive/Neuro/Behavioral WDL    Cognitive/Neuro/Behavioral WDL WDL              "

## 2023-05-17 NOTE — ED PROVIDER NOTES
Emergency Department Encounter      NAME: Mikhail Espino  AGE: 68 year old male  YOB: 1955  MRN: [id number]  EVALUATION DATE & TIME: 2023  1:10 PM    PCP: Jd Aceves    ED PROVIDER: Anthony Gil M.D.      Chief Complaint   Patient presents with     Syncope         FINAL IMPRESSION:  1. Near syncope    2. Thrombocytopenia (H)        MEDICAL DECISION MAKIN:24 PM I met with the patient, obtained history, performed an initial exam, and discussed options and plan for diagnostics and treatment here in the ED.   6:52 PM I rechecked and updated the patient.    This patient is a 68-year-old male who is brought to the ER by paramedics for a near syncopal episode that he had at home.  The wife gives much of the history.  She says that he had a normal morning where he had a workout and then ate lunch and some hot cocoa.  He says that afterward he got sleepy and became very diaphoretic.  His wife said that he was awake but he was not answering her questions.  He was ambulatory with assistance and was able to ambulate out to the ambulance.  He denied any other symptoms.  The patient and his wife take a number of supplements and uses a vegan diet.  There is nothing unusual on his exam.  A EKG was done.  I independently reviewed and interpreted the EKG.  It did not show any signs of MI or cardiac ischemia.  He was kept on the cardiac monitor and remained in sinus rhythm while in the ER.  His lab work was remarkable only for some mild thrombocytopenia.  A chest x-ray was done as well which did not show any acute findings.  I reviewed and interpreted the chest x-ray.  I reviewed the patient's medical records.  Patient was given a bolus of IV fluid in the ER.  He was feeling better after this and was comfortable being discharged home.  I discussed having him follow-up with the cardiology rapid access clinic but both he and his wife did not want to do this.  Instead they will follow-up with her  doctor and will consider follow-up with cardiology.  However they are pretty confident that the patient had a panic attack and that the stress in his life because of this problem.  He sees a psychologist and will be working on reducing stress in his life.    Pertinent Labs & Imaging studies reviewed. (See chart for details)    The importance of close follow up was discussed. We reviewed warning signs and symptoms, and I instructed Mr. Espino to return to the emergency department immediately if he develops any new or worsening symptoms. I provided additional verbal discharge instructions. Mr. Espino expressed understanding and agreement with this plan of care, his questions were answered, and he was discharged in stable condition.     Medical Decision Making     History:    Supplemental history from: Documented in chart, if applicable    External Record(s) reviewed: Documented in chart, if applicable.     Work Up:    Chart documentation includes differential considered and any EKGs or imaging independently interpreted by provider, where specified.    In additional to work up documented, I considered the following work up: Documented in chart, if applicable.     External consultation:    Discussion of management with another provider: Documented in chart, if applicable     Complicating factors:    Care impacted by chronic illness: prostate cancer    Care affected by social determinants of health: Access to Medical Care     Disposition considerations: Discharge      MEDICATIONS GIVEN IN THE EMERGENCY:  Medications   0.9% sodium chloride BOLUS (0 mLs Intravenous Stopped 5/17/23 1943)       NEW PRESCRIPTIONS STARTED AT TODAY'S ER VISIT:  Discharge Medication List as of 5/17/2023  7:43 PM             =================================================================    HPI    Patient information was obtained from: patient     Use of : N/A       Mikhail Espino is a 68 year old male with a past medical history of  "prostate cancer who presents with near syncope.    Patient presents via EMS for evaluation of a near syncopal episode. He had a normal morning where he worked out and came home for lunch. While he was eating a PB/J sandwich and hot cacao he got very drowsy and diaphoretic and was not responding to his wife but his eyes were open and he remained sitting.. He does not remember this episode until EMS arrived so history is supplemented by his wife. She then walked him to the couch to lay down but he was \"barely able to walk\". He was able to walk out to the ambulance but had difficulty again. Right now, he feels tired but is in no pain and is unhappy that this episode occurred (but is not mad that he was brought here). He does have a history of prostate cancer and had a prostatectomy 9 months ago. He takes 100 mg Viagra each morning for recovery. He gets regular tests for PSA and they have consistently been negative. He does take a lot of vitamin/mineral supplements and is vegan but has not changed his diet or intake of these recently. He denies history of syncope.    He does not take any daily medications and has no chronic illnesses besides prior history of prostate cancer. He was not given nitroglycerin or other pain medications in the ambulance. Denies cough, sore throat, rash, leg swelling, fever, or any other complaints at this time.       REVIEW OF SYSTEMS   Review of Systems   Constitutional: Positive for diaphoresis (resolved) and fatigue. Negative for fever.   HENT: Negative for sore throat.    Respiratory: Negative for cough.    Cardiovascular: Negative for leg swelling.   Skin: Negative for rash.   Neurological: Negative for syncope (near syncope).   All other systems reviewed and are negative.         PAST MEDICAL HISTORY:  Past Medical History:   Diagnosis Date     Prostate cancer (H)      Thrombocytopenia (H)        PAST SURGICAL HISTORY:  Past Surgical History:   Procedure Laterality Date     " PROSTATECTOMY, RETROPUBIC, RADICAL, ROBOT-ASSISTED, LAP, USING DA SHABNAM XI, W/PELVIC LYMPHADENECTOMY Bilateral 8/8/2022    Procedure: DAVINCI ROBOTIC RADICAL PROSTATECTOMY AND BILATERAL PELVIC LYMPH NODE DISSECTION;  Surgeon: Mikey Villareal MD;  Location: [facility] Main OR       CURRENT MEDICATIONS:    No current facility-administered medications for this encounter.    Current Outpatient Medications:      acetaminophen (TYLENOL) 325 MG tablet, Take 1-2 tablets (325-650 mg) by mouth every 4 hours as needed for mild pain (For optimal non-opioid multimodal pain management to improve pain control.), Disp: , Rfl:      bacitracin 500 UNIT/GM OINT, Apply topically 3 times daily Apply to tip of penis while thomas catheter is present., Disp: , Rfl:      co-enzyme Q-10 100 MG CAPS capsule, Take 1 capsule (100 mg) by mouth daily, Disp: , Rfl:      fish oil-omega-3 fatty acids 1000 MG capsule, Take 1 capsule (1 g) by mouth daily You may take your normal dose but hold for 2 weeks from 8/8/22 due to increase risk for bleeding., Disp: , Rfl:      multivitamin w/minerals (THERA-VIT-M) tablet, Take 1 tablet by mouth daily, Disp: , Rfl:      oxyCODONE (ROXICODONE) 5 MG tablet, Take 1 tablet (5 mg) by mouth every 6 hours as needed for moderate to severe pain, Disp: 13 tablet, Rfl: 0     vitamin C (ASCORBIC ACID) 100 MG tablet, Take 200 mg by mouth daily, Disp: , Rfl:     ALLERGIES:  No Known Allergies    FAMILY HISTORY:  History reviewed. No pertinent family history.    SOCIAL HISTORY:   Social History     Socioeconomic History     Marital status:    Tobacco Use     Smoking status: Never     Smokeless tobacco: Never   Vaping Use     Vaping status: Never Used   Substance and Sexual Activity     Alcohol use: Not Currently     Drug use: Not Currently       PHYSICAL EXAM:    Vitals: /75   Pulse 60   Temp 98.7  F (37.1  C) (Oral)   Resp 18   SpO2 100%    Constitutional: Thin Quiet black male who does not volunteer very  much information. History is supplemented by wife.   HEAD:Normocephalic, atraumatic,   Eyes: PERRLA, EOM intact, conjunctiva clear, no discharge  ENT: mucous membranes moist, nose normal.   Neck- Supple, gross ROM intact.  No JVD.  No palpable nodes.  Pulmonary: Clear to auscultation bilaterally, no respiratory distress, no wheezing, speaks full sentences easily.  Chest: No chest wall tenderness  Cardiovascular: Normal heart rate, regular rhythm, no murmurs. No lower extremity edema, 2+ DP pulses.   GI: Soft, no tenderness to deep palpation in all quadrants, not distended, no masses.  No hepatosplenomegaly.  Musculoskeletal: Moving all 4 extremities intentionally and without pain. No obvious deformity. No calf tenderness or lower extremity edema.  Back: No CVA tenderness  Skin: Warm, dry, no rash.  Neurologic: Alert & oriented x 3, speech clear, moving all extremities spontaneously   Psychiatric: Affect normal, cooperative.     LAB:  All pertinent labs reviewed and interpreted.  Labs Ordered and Resulted from Time of ED Arrival to Time of ED Departure   COMPREHENSIVE METABOLIC PANEL - Abnormal       Result Value    Sodium 139      Potassium 4.2      Chloride 103      Carbon Dioxide (CO2) 25      Anion Gap 11      Urea Nitrogen 19.6      Creatinine 1.08      Calcium 9.8      Glucose 100 (*)     Alkaline Phosphatase 74      AST 50      ALT 24      Protein Total 7.4      Albumin 4.5      Bilirubin Total 0.7      GFR Estimate 74     ROUTINE UA WITH MICROSCOPIC REFLEX TO CULTURE - Abnormal    Color Urine Yellow      Appearance Urine Clear      Glucose Urine Negative      Bilirubin Urine Negative      Ketones Urine Negative      Specific Gravity Urine 1.023      Blood Urine Negative      pH Urine 6.0      Protein Albumin Urine 10 (*)     Urobilinogen Urine <2.0      Nitrite Urine Negative      Leukocyte Esterase Urine Negative      Mucus Urine Present (*)     RBC Urine <1      WBC Urine 1      Hyaline Casts Urine 1      CBC WITH PLATELETS AND DIFFERENTIAL - Abnormal    WBC Count 6.4      RBC Count 5.00      Hemoglobin 15.2      Hematocrit 45.0      MCV 90      MCH 30.4      MCHC 33.8      RDW 13.7      Platelet Count 115 (*)     % Neutrophils 73      % Lymphocytes 18      % Monocytes 6      % Eosinophils 1      % Basophils 1      % Immature Granulocytes 1      NRBCs per 100 WBC 0      Absolute Neutrophils 4.8      Absolute Lymphocytes 1.1      Absolute Monocytes 0.4      Absolute Eosinophils 0.0      Absolute Basophils 0.0      Absolute Immature Granulocytes 0.0      Absolute NRBCs 0.0     D DIMER QUANTITATIVE - Normal    D-Dimer Quantitative 0.41     MAGNESIUM - Normal    Magnesium 2.1     CRP INFLAMMATION - Normal    CRP Inflammation <3.00     TROPONIN T, HIGH SENSITIVITY - Normal    Troponin T, High Sensitivity 10         RADIOLOGY:  XR Chest 2 Views   Final Result   IMPRESSION: Lungs are hyperexpanded suggestive of COPD. No hydropneumothorax or fracture. Lungs are clear. Heart and pulmonary vascularity are normal.      Changes of DISH in the thoracic spine. No sclerotic bone metastases. Diminutive right first rib relative to the left.          EKG:   Performed at: May 17, 2023, 2:15 PM, [facility] EMERGENCY DEPARTMENT    Impression: Sinus rhythm. Nonspecific ST and T wave abnormality. Abnormal ECG. No previous ECGs available.  Rate: 65 BPM  Rhythm: Sinus rhythm  QRS Interval: 74  QTc Interval: 412/428 ms  Comparison: No previous ECGs available.    I have independently reviewed and interpreted the EKG(s) documented above.       I, Dennis Mehta, am serving as a scribe to document services personally performed by Dr. Anthony Gil based on my observation and the provider's statements to me. IAnthony M.D. attest that Dennis Mehta is acting in a scribe capacity, has observed my performance of the services and has documented them in accordance with my direction.      Anthony Gil  M.D.  Emergency Medicine  [facility] EMERGENCY DEPARTMENT  [facility] [facility] [id number] [phone]  Dept: [phone]     Anthony Gil MD  05/17/23 1227

## 2023-05-17 NOTE — ED NOTES
Bed: JNED-20  Expected date: 5/17/23  Expected time:   Means of arrival:   Comments:  Gena Franklin  68 year old male  Syncopal Episode

## 2023-05-18 LAB
ATRIAL RATE - MUSE: 65 BPM
DIASTOLIC BLOOD PRESSURE - MUSE: 70 MMHG
INTERPRETATION ECG - MUSE: NORMAL
P AXIS - MUSE: 59 DEGREES
PR INTERVAL - MUSE: 202 MS
QRS DURATION - MUSE: 74 MS
QT - MUSE: 412 MS
QTC - MUSE: 428 MS
R AXIS - MUSE: 64 DEGREES
SYSTOLIC BLOOD PRESSURE - MUSE: 138 MMHG
T AXIS - MUSE: 32 DEGREES
VENTRICULAR RATE- MUSE: 65 BPM

## (undated) DEVICE — TUBING FILTER TRI-LUMEN AIRSEAL ASC-EVAC1

## (undated) DEVICE — PREP DURAPREP 26ML APL 8630

## (undated) DEVICE — GLOVE BIOGEL PI ULTRATOUCH G SZ 8.0 42180

## (undated) DEVICE — PAD POS XL 1X20X40IN PINK PIGAZZI

## (undated) DEVICE — CLIP ENDO HEMO-LOC PURPLE LG 544240

## (undated) DEVICE — PROTECTOR ARM STANDARD ONE STEP

## (undated) DEVICE — DAVINCI XI DRAPE ARM 470015

## (undated) DEVICE — SYSTEM LAPAROVUE VISIBILITY LAPVUE10

## (undated) DEVICE — LUBRICANT INST ELECTROLUBE EL101

## (undated) DEVICE — SU MONOCRYL 3-0 RB-1 27" Y305H

## (undated) DEVICE — PREP POVIDONE IODINE SOLUTION 10% 4OZ BOTTLE 29906-004

## (undated) DEVICE — NDL INSUFFLATION 13GA 120MM C2201

## (undated) DEVICE — SUCTION MANIFOLD NEPTUNE 2 SYS 4 PORT 0702-020-000

## (undated) DEVICE — PLATE GROUNDING ADULT W/CORD 9165L

## (undated) DEVICE — DAVINCI XI OBTURATOR BLADELESS 8MM 470359

## (undated) DEVICE — SU MONOCRYL 3-0 RB-1 27" UND Y215H

## (undated) DEVICE — SUTURE VICRYL+ 0 27IN CT-1 VLT VCP340H

## (undated) DEVICE — DRAPE IOBAN INCISE 23X17" 6650EZ

## (undated) DEVICE — DAVINCI HOT SHEARS TIP COVER  400180

## (undated) DEVICE — ENDO SHEARS RENEW LAP ENDOCUT SCISSOR TIP 16.5MM 3142

## (undated) DEVICE — DAVINCI XI SEAL UNIVERSAL 5-8MM 470361

## (undated) DEVICE — DRAPE U SPLIT 74X120" 29440

## (undated) DEVICE — MAT FLOOR SURGICAL 40X38 0702140238

## (undated) DEVICE — SYR 50ML SLIP TIP W/O NDL 309654

## (undated) DEVICE — CATH FOLEY 5CC 20FR SIL/LTX 0165V20S

## (undated) DEVICE — ENDO TROCAR CONMED AIRSEAL BLADELESS 12X120MM IAS12-120LP

## (undated) DEVICE — ENDO TROCAR FIRST ENTRY KII FIOS Z-THRD 05X100MM CTF03

## (undated) DEVICE — GLOVE BIOGEL PI INDICATOR 8.0 LF 41680

## (undated) DEVICE — SU CLIP LAPRA TY XC200

## (undated) DEVICE — SUTURE VICRYL+ 4-0 UNDYED PS-2 VCP496H

## (undated) DEVICE — DAVINCI XI DRAPE COLUMN 470341

## (undated) DEVICE — SOL WATER IRRIG 1000ML BOTTLE 2F7114

## (undated) DEVICE — ADH SKIN CLOSURE PREMIERPRO EXOFIN 1.0ML 3470

## (undated) DEVICE — ENDO POUCH UNIV RETRIEVAL SYSTEM INZII 10MM CD001

## (undated) DEVICE — DECANTER VIAL 2006S

## (undated) DEVICE — TUBING LAP SUCT/IRRIG STRYKER 250070500

## (undated) DEVICE — SUTURE VICRYL+ 0 36IN CT-1 UND VCP946H

## (undated) DEVICE — CUSTOM PACK DA VINCI SBA5BDVHEA

## (undated) DEVICE — ESU PENCIL W/HOLSTER E2350H

## (undated) DEVICE — SUTURE VICRYL+ 2-0 27IN SH UND VCP417H

## (undated) DEVICE — SU STRATAFIX PDS PLUS 0 CT-1 30CM SXPP1B450

## (undated) DEVICE — PREP POVIDONE-IODINE 7.5% SCRUB 4OZ BOTTLE MDS093945

## (undated) RX ORDER — LIDOCAINE HYDROCHLORIDE 10 MG/ML
INJECTION, SOLUTION EPIDURAL; INFILTRATION; INTRACAUDAL; PERINEURAL
Status: DISPENSED
Start: 2022-08-08

## (undated) RX ORDER — BUPIVACAINE HYDROCHLORIDE 5 MG/ML
INJECTION, SOLUTION EPIDURAL; INTRACAUDAL
Status: DISPENSED
Start: 2022-08-08

## (undated) RX ORDER — EPHEDRINE SULFATE 50 MG/ML
INJECTION, SOLUTION INTRAMUSCULAR; INTRAVENOUS; SUBCUTANEOUS
Status: DISPENSED
Start: 2022-08-08

## (undated) RX ORDER — ONDANSETRON 2 MG/ML
INJECTION INTRAMUSCULAR; INTRAVENOUS
Status: DISPENSED
Start: 2022-08-08

## (undated) RX ORDER — PROPOFOL 10 MG/ML
INJECTION, EMULSION INTRAVENOUS
Status: DISPENSED
Start: 2022-08-08

## (undated) RX ORDER — KETAMINE HYDROCHLORIDE 10 MG/ML
INJECTION INTRAMUSCULAR; INTRAVENOUS
Status: DISPENSED
Start: 2022-08-08

## (undated) RX ORDER — DEXAMETHASONE SODIUM PHOSPHATE 10 MG/ML
INJECTION INTRAMUSCULAR; INTRAVENOUS
Status: DISPENSED
Start: 2022-08-08

## (undated) RX ORDER — ATROPA BELLADONNA AND OPIUM 16.2; 3 MG/1; MG/1
SUPPOSITORY RECTAL
Status: DISPENSED
Start: 2022-08-08

## (undated) RX ORDER — FENTANYL CITRATE 50 UG/ML
INJECTION, SOLUTION INTRAMUSCULAR; INTRAVENOUS
Status: DISPENSED
Start: 2022-08-08